# Patient Record
Sex: FEMALE | Race: WHITE | NOT HISPANIC OR LATINO
[De-identification: names, ages, dates, MRNs, and addresses within clinical notes are randomized per-mention and may not be internally consistent; named-entity substitution may affect disease eponyms.]

---

## 2018-06-24 ENCOUNTER — MOBILE ON CALL (OUTPATIENT)
Age: 78
End: 2018-06-24

## 2018-06-26 ENCOUNTER — APPOINTMENT (OUTPATIENT)
Dept: HEMATOLOGY ONCOLOGY | Facility: CLINIC | Age: 78
End: 2018-06-26

## 2021-04-21 ENCOUNTER — EMERGENCY (EMERGENCY)
Facility: HOSPITAL | Age: 81
LOS: 0 days | Discharge: HOME | End: 2021-04-21
Attending: EMERGENCY MEDICINE | Admitting: EMERGENCY MEDICINE
Payer: MEDICARE

## 2021-04-21 VITALS
WEIGHT: 214.95 LBS | RESPIRATION RATE: 20 BRPM | HEIGHT: 65 IN | DIASTOLIC BLOOD PRESSURE: 83 MMHG | SYSTOLIC BLOOD PRESSURE: 184 MMHG | HEART RATE: 95 BPM | OXYGEN SATURATION: 94 % | TEMPERATURE: 98 F

## 2021-04-21 DIAGNOSIS — I10 ESSENTIAL (PRIMARY) HYPERTENSION: ICD-10-CM

## 2021-04-21 DIAGNOSIS — R10.32 LEFT LOWER QUADRANT PAIN: ICD-10-CM

## 2021-04-21 DIAGNOSIS — Z87.19 PERSONAL HISTORY OF OTHER DISEASES OF THE DIGESTIVE SYSTEM: ICD-10-CM

## 2021-04-21 DIAGNOSIS — K21.9 GASTRO-ESOPHAGEAL REFLUX DISEASE WITHOUT ESOPHAGITIS: ICD-10-CM

## 2021-04-21 DIAGNOSIS — R10.31 RIGHT LOWER QUADRANT PAIN: ICD-10-CM

## 2021-04-21 DIAGNOSIS — R19.7 DIARRHEA, UNSPECIFIED: ICD-10-CM

## 2021-04-21 DIAGNOSIS — E78.5 HYPERLIPIDEMIA, UNSPECIFIED: ICD-10-CM

## 2021-04-21 LAB
ALBUMIN SERPL ELPH-MCNC: 4.6 G/DL — SIGNIFICANT CHANGE UP (ref 3.5–5.2)
ALP SERPL-CCNC: 103 U/L — SIGNIFICANT CHANGE UP (ref 30–115)
ALT FLD-CCNC: 17 U/L — SIGNIFICANT CHANGE UP (ref 0–41)
ANION GAP SERPL CALC-SCNC: 14 MMOL/L — SIGNIFICANT CHANGE UP (ref 7–14)
APPEARANCE UR: CLEAR — SIGNIFICANT CHANGE UP
AST SERPL-CCNC: 22 U/L — SIGNIFICANT CHANGE UP (ref 0–41)
BACTERIA # UR AUTO: NEGATIVE — SIGNIFICANT CHANGE UP
BASOPHILS # BLD AUTO: 0.03 K/UL — SIGNIFICANT CHANGE UP (ref 0–0.2)
BASOPHILS NFR BLD AUTO: 0.3 % — SIGNIFICANT CHANGE UP (ref 0–1)
BILIRUB SERPL-MCNC: 0.3 MG/DL — SIGNIFICANT CHANGE UP (ref 0.2–1.2)
BILIRUB UR-MCNC: NEGATIVE — SIGNIFICANT CHANGE UP
BUN SERPL-MCNC: 19 MG/DL — SIGNIFICANT CHANGE UP (ref 10–20)
CALCIUM SERPL-MCNC: 10 MG/DL — SIGNIFICANT CHANGE UP (ref 8.5–10.1)
CHLORIDE SERPL-SCNC: 106 MMOL/L — SIGNIFICANT CHANGE UP (ref 98–110)
CO2 SERPL-SCNC: 20 MMOL/L — SIGNIFICANT CHANGE UP (ref 17–32)
COLOR SPEC: SIGNIFICANT CHANGE UP
CREAT SERPL-MCNC: 1 MG/DL — SIGNIFICANT CHANGE UP (ref 0.7–1.5)
DIFF PNL FLD: NEGATIVE — SIGNIFICANT CHANGE UP
EOSINOPHIL # BLD AUTO: 0.01 K/UL — SIGNIFICANT CHANGE UP (ref 0–0.7)
EOSINOPHIL NFR BLD AUTO: 0.1 % — SIGNIFICANT CHANGE UP (ref 0–8)
EPI CELLS # UR: 0 /HPF — SIGNIFICANT CHANGE UP (ref 0–5)
GLUCOSE SERPL-MCNC: 148 MG/DL — HIGH (ref 70–99)
GLUCOSE UR QL: NEGATIVE — SIGNIFICANT CHANGE UP
HCT VFR BLD CALC: 40.3 % — SIGNIFICANT CHANGE UP (ref 37–47)
HGB BLD-MCNC: 12.9 G/DL — SIGNIFICANT CHANGE UP (ref 12–16)
HYALINE CASTS # UR AUTO: 1 /LPF — SIGNIFICANT CHANGE UP (ref 0–7)
IMM GRANULOCYTES NFR BLD AUTO: 0.4 % — HIGH (ref 0.1–0.3)
KETONES UR-MCNC: NEGATIVE — SIGNIFICANT CHANGE UP
LEUKOCYTE ESTERASE UR-ACNC: ABNORMAL
LIDOCAIN IGE QN: 13 U/L — SIGNIFICANT CHANGE UP (ref 7–60)
LYMPHOCYTES # BLD AUTO: 1.24 K/UL — SIGNIFICANT CHANGE UP (ref 1.2–3.4)
LYMPHOCYTES # BLD AUTO: 11.7 % — LOW (ref 20.5–51.1)
MCHC RBC-ENTMCNC: 26 PG — LOW (ref 27–31)
MCHC RBC-ENTMCNC: 32 G/DL — SIGNIFICANT CHANGE UP (ref 32–37)
MCV RBC AUTO: 81.3 FL — SIGNIFICANT CHANGE UP (ref 81–99)
MONOCYTES # BLD AUTO: 0.7 K/UL — HIGH (ref 0.1–0.6)
MONOCYTES NFR BLD AUTO: 6.6 % — SIGNIFICANT CHANGE UP (ref 1.7–9.3)
NEUTROPHILS # BLD AUTO: 8.62 K/UL — HIGH (ref 1.4–6.5)
NEUTROPHILS NFR BLD AUTO: 80.9 % — HIGH (ref 42.2–75.2)
NITRITE UR-MCNC: NEGATIVE — SIGNIFICANT CHANGE UP
NRBC # BLD: 0 /100 WBCS — SIGNIFICANT CHANGE UP (ref 0–0)
PH UR: 6 — SIGNIFICANT CHANGE UP (ref 5–8)
PLATELET # BLD AUTO: 264 K/UL — SIGNIFICANT CHANGE UP (ref 130–400)
POTASSIUM SERPL-MCNC: 4.2 MMOL/L — SIGNIFICANT CHANGE UP (ref 3.5–5)
POTASSIUM SERPL-SCNC: 4.2 MMOL/L — SIGNIFICANT CHANGE UP (ref 3.5–5)
PROT SERPL-MCNC: 7.7 G/DL — SIGNIFICANT CHANGE UP (ref 6–8)
PROT UR-MCNC: SIGNIFICANT CHANGE UP
RBC # BLD: 4.96 M/UL — SIGNIFICANT CHANGE UP (ref 4.2–5.4)
RBC # FLD: 19.9 % — HIGH (ref 11.5–14.5)
RBC CASTS # UR COMP ASSIST: 2 /HPF — SIGNIFICANT CHANGE UP (ref 0–4)
SODIUM SERPL-SCNC: 140 MMOL/L — SIGNIFICANT CHANGE UP (ref 135–146)
SP GR SPEC: 1.01 — SIGNIFICANT CHANGE UP (ref 1.01–1.03)
UROBILINOGEN FLD QL: SIGNIFICANT CHANGE UP
WBC # BLD: 10.64 K/UL — SIGNIFICANT CHANGE UP (ref 4.8–10.8)
WBC # FLD AUTO: 10.64 K/UL — SIGNIFICANT CHANGE UP (ref 4.8–10.8)
WBC UR QL: 1 /HPF — SIGNIFICANT CHANGE UP (ref 0–5)

## 2021-04-21 PROCEDURE — 99285 EMERGENCY DEPT VISIT HI MDM: CPT

## 2021-04-21 PROCEDURE — 74177 CT ABD & PELVIS W/CONTRAST: CPT | Mod: 26,MA

## 2021-04-21 RX ORDER — MORPHINE SULFATE 50 MG/1
4 CAPSULE, EXTENDED RELEASE ORAL ONCE
Refills: 0 | Status: DISCONTINUED | OUTPATIENT
Start: 2021-04-21 | End: 2021-04-21

## 2021-04-21 RX ORDER — ONDANSETRON 8 MG/1
4 TABLET, FILM COATED ORAL ONCE
Refills: 0 | Status: COMPLETED | OUTPATIENT
Start: 2021-04-21 | End: 2021-04-21

## 2021-04-21 RX ORDER — SODIUM CHLORIDE 9 MG/ML
1000 INJECTION, SOLUTION INTRAVENOUS ONCE
Refills: 0 | Status: COMPLETED | OUTPATIENT
Start: 2021-04-21 | End: 2021-04-21

## 2021-04-21 RX ADMIN — SODIUM CHLORIDE 1000 MILLILITER(S): 9 INJECTION, SOLUTION INTRAVENOUS at 01:52

## 2021-04-21 RX ADMIN — MORPHINE SULFATE 4 MILLIGRAM(S): 50 CAPSULE, EXTENDED RELEASE ORAL at 01:52

## 2021-04-21 RX ADMIN — ONDANSETRON 4 MILLIGRAM(S): 8 TABLET, FILM COATED ORAL at 01:52

## 2021-04-21 RX ADMIN — MORPHINE SULFATE 4 MILLIGRAM(S): 50 CAPSULE, EXTENDED RELEASE ORAL at 03:36

## 2021-04-21 NOTE — ED PROVIDER NOTE - NS ED ROS FT
CONSTITUTIONAL: (-) fevers, (-) chills, (-) malaise, (-) decreased appetite  ENT: (-) congestion, (-) rhinorrhea, (-) sore throat  NECK: (-) neck pain, (-) neck stiffness  CARDIO: (-) chest pain, (-) palpitations, (-) edema  PULM: (-) cough, (-) sputum, (-) chest tightness, (-) shortness of breath  GI: see HPI  : (-) dysuria, (-) hematuria, (-) frequency, (-) urgency, (-) flank pain  HEME: (-) easy bruising, (-) easy bleeding  MSK: (-) back pain, (-) myalgias  SKIN: (-) rashes, (-) pallor, (-) jaundice  NEURO: (-) headache, (-) dizziness, (-) lightheadedness, (-) weakness, (-) syncope    *all other systems negative except as documented above and in the HPI*

## 2021-04-21 NOTE — ED PROVIDER NOTE - CLINICAL SUMMARY MEDICAL DECISION MAKING FREE TEXT BOX
81 yo F, hx of HTN, HLD, GERD, diverticulosis here for assessment of lower abdominal pain -- pain worse on L side, radiates from lower abdomen to back. No nausea, vomiting, fever, chills -- had loose stools x 1 week prior to onset, non bloody.     VS normal, on exam is well appearing in no distress, clear lungs, RRR, soft, NT, ND abdomen, no CVA ttp.     UA negative for infection, labs unremarkable, CT without acute abdominal findings.     Patient is resting comfortably, in no distress, discussed need for close monitoring with GI, return precautions.

## 2021-04-21 NOTE — ED PROVIDER NOTE - PATIENT PORTAL LINK FT
You can access the FollowMyHealth Patient Portal offered by [facility] by registering at the following website: [email or web address]. By joining [facility]’s FollowMyHealth portal, you will also be able to view your health information using other applications (apps) compatible with our system.

## 2021-04-21 NOTE — ED ADULT NURSE NOTE - PMH
GERD (Gastroesophageal Reflux Disease)    HTN - Hypertension    Hypercholesteremia    OA (Osteoarthritis)

## 2021-04-21 NOTE — ED PROVIDER NOTE - PROGRESS NOTE DETAILS
RENAN: patient sleeping, wakes easily to voice. states pain has improved. abd soft, nontender throughout. ct read pending, will continue to monitor. RENAN: Results discussed with patient, printed copies given. Recommended GI f/u. Patient agreeable and verbalizes understanding of plan of care, f/u, and return precautions.

## 2021-04-21 NOTE — ED PROVIDER NOTE - OBJECTIVE STATEMENT
80 year old female w hx of HTN, HLD, GERD, diverticulosis (GI Dr. Fam) presents to the ED for evaluation of gradual onset constant b/l lower abdominal pain, L>R with radiation into her back. Pain was preceded by 1 week of intermittent episodes of nonbloody diarrhea. Denies fevers/chills, chest pain, sob, n/v, constipation, obstipation, irritative voiding symptoms, black/bloody stools, recent travel/sick contacts/antibiotics, abnl vaginal bleeding/discharge.

## 2021-04-21 NOTE — ED PROVIDER NOTE - PHYSICAL EXAMINATION
VITALS:  I have reviewed the initial vital signs.  GENERAL: Well-developed, well-nourished, in no acute distress. Nontoxic.  HEENT: Sclera clear. No conjunctival pallor. EOMI, PERRLA. MMM.  NECK: supple w FROM.   CARDIO: RRR, nl S1 and S2. No murmurs, rubs, or gallops.   PULM: Normal effort. CTA b/l without wheezes, rales, or rhonchi.  MSK: Normal, steady gait.  GI: Normal bowel sounds. Abdomen soft and non-distended. +b/l lower abdominal pain, L>R, without rebound or guarding.   : No CVA tenderness b/l.  SKIN: Warm, dry. No pallor. No rash. No jaundice.   NEURO: A&Ox3. Speech clear.  No focal deficits.  PSYCH: Calm and cooperative.

## 2021-04-21 NOTE — ED PROVIDER NOTE - MDM PATIENT STATEMENT FOR ADDL TREATMENT
Discharge Information    IV Discontiued Time:  NA    Amount of Fluid Infused:  NA    Discharge Criteria = When patient returns to baseline or as per MD order    Consciousness:  Pt is fully awake    Circulation:  BP +/- 20% of pre-procedure level    Respiration:  Patient is able to breathe deeply    O2 Sat:  Patient is able to maintain O2 Sat >92% on room air    Activity:  Moves 4 extremities on command    Ambulation:  Patient is able to stand and walk or stand and pivot into wheelchair    Dressing:  Clean/dry or No Dressing    Notes:   Discharge instructions and AVS given to patient    Patient meets criteria for discharge?  YES    Admitted to PCU?  No    Responsible adult present to accompany patient home?  Yes    Signature/Title:    Devora Jain RN Care Coordinator  [facility]  
Pre-procedure Intake    Have you been fasting? No     If yes, for how long?     Are you taking a prescribed blood thinner such as coumadin, Plavix, Xarelto?    Yes -   Plavix    If yes, when did you take your last dose? A WEEK AGO    Do you take aspirin?  No    If cervical procedure, have you held aspirin for 6 days?   NA    Do you have any allergies to contrast dye, iodine, steroid and/or numbing medications?  NO    Are you currently taking antibiotics or have an active infection?  NO    Have you had a fever/elevated temperature within the past week? NO    Are you currently taking oral steroids? NO    Do you have a ? Yes       Are you pregnant or breastfeeding?  NO    Are the vital signs normal?  Yes        
Patient with one or more new problems requiring additional work-up/treatment.

## 2021-04-21 NOTE — ED ADULT NURSE NOTE - OBJECTIVE STATEMENT
80yr old female presents w. intermittent abd pain for 1 wk. pt reports nausua and diarrhea. denies vomiting. pt has a history of diverticulitis

## 2021-04-21 NOTE — ED PROVIDER NOTE - CARE PROVIDER_API CALL
July Fam  GASTROENTEROLOGY  [address] [place] [address]  Phone: [phone]  Fax: [phone]  Follow Up Time: 1-3 Days

## 2021-04-23 LAB
CULTURE RESULTS: SIGNIFICANT CHANGE UP
SPECIMEN SOURCE: SIGNIFICANT CHANGE UP

## 2025-01-19 ENCOUNTER — INPATIENT (INPATIENT)
Facility: HOSPITAL | Age: 85
LOS: 2 days | Discharge: HOME | DRG: 194 | End: 2025-01-22
Attending: HOSPITALIST | Admitting: INTERNAL MEDICINE
Payer: MEDICARE

## 2025-01-19 VITALS
RESPIRATION RATE: 20 BRPM | WEIGHT: 210.1 LBS | HEART RATE: 94 BPM | OXYGEN SATURATION: 97 % | HEIGHT: 64 IN | DIASTOLIC BLOOD PRESSURE: 93 MMHG | TEMPERATURE: 98 F | SYSTOLIC BLOOD PRESSURE: 170 MMHG

## 2025-01-19 DIAGNOSIS — J10.1 INFLUENZA DUE TO OTHER IDENTIFIED INFLUENZA VIRUS WITH OTHER RESPIRATORY MANIFESTATIONS: ICD-10-CM

## 2025-01-19 LAB
ALBUMIN SERPL ELPH-MCNC: 3.7 G/DL — SIGNIFICANT CHANGE UP (ref 3.5–5.2)
ALP SERPL-CCNC: 93 U/L — SIGNIFICANT CHANGE UP (ref 30–115)
ALT FLD-CCNC: 17 U/L — SIGNIFICANT CHANGE UP (ref 0–41)
ANION GAP SERPL CALC-SCNC: 13 MMOL/L — SIGNIFICANT CHANGE UP (ref 7–14)
AST SERPL-CCNC: 33 U/L — SIGNIFICANT CHANGE UP (ref 0–41)
BASOPHILS # BLD AUTO: 0.03 K/UL — SIGNIFICANT CHANGE UP (ref 0–0.2)
BASOPHILS NFR BLD AUTO: 0.7 % — SIGNIFICANT CHANGE UP (ref 0–1)
BILIRUB SERPL-MCNC: 0.2 MG/DL — SIGNIFICANT CHANGE UP (ref 0.2–1.2)
BUN SERPL-MCNC: 39 MG/DL — HIGH (ref 10–20)
CALCIUM SERPL-MCNC: 8.6 MG/DL — SIGNIFICANT CHANGE UP (ref 8.4–10.5)
CHLORIDE SERPL-SCNC: 102 MMOL/L — SIGNIFICANT CHANGE UP (ref 98–110)
CO2 SERPL-SCNC: 23 MMOL/L — SIGNIFICANT CHANGE UP (ref 17–32)
CREAT SERPL-MCNC: 1.3 MG/DL — SIGNIFICANT CHANGE UP (ref 0.7–1.5)
EGFR: 40 ML/MIN/1.73M2 — LOW
EOSINOPHIL # BLD AUTO: 0.1 K/UL — SIGNIFICANT CHANGE UP (ref 0–0.7)
EOSINOPHIL NFR BLD AUTO: 2.4 % — SIGNIFICANT CHANGE UP (ref 0–8)
FLUAV AG NPH QL: DETECTED
FLUBV AG NPH QL: SIGNIFICANT CHANGE UP
GLUCOSE SERPL-MCNC: 115 MG/DL — HIGH (ref 70–99)
HCT VFR BLD CALC: 45.5 % — SIGNIFICANT CHANGE UP (ref 37–47)
HGB BLD-MCNC: 14.8 G/DL — SIGNIFICANT CHANGE UP (ref 12–16)
IMM GRANULOCYTES NFR BLD AUTO: 0.2 % — SIGNIFICANT CHANGE UP (ref 0.1–0.3)
LYMPHOCYTES # BLD AUTO: 1.16 K/UL — LOW (ref 1.2–3.4)
LYMPHOCYTES # BLD AUTO: 27.8 % — SIGNIFICANT CHANGE UP (ref 20.5–51.1)
MCHC RBC-ENTMCNC: 31.2 PG — HIGH (ref 27–31)
MCHC RBC-ENTMCNC: 32.5 G/DL — SIGNIFICANT CHANGE UP (ref 32–37)
MCV RBC AUTO: 96 FL — SIGNIFICANT CHANGE UP (ref 81–99)
MONOCYTES # BLD AUTO: 0.78 K/UL — HIGH (ref 0.1–0.6)
MONOCYTES NFR BLD AUTO: 18.7 % — HIGH (ref 1.7–9.3)
NEUTROPHILS # BLD AUTO: 2.09 K/UL — SIGNIFICANT CHANGE UP (ref 1.4–6.5)
NEUTROPHILS NFR BLD AUTO: 50.2 % — SIGNIFICANT CHANGE UP (ref 42.2–75.2)
NRBC # BLD: 0 /100 WBCS — SIGNIFICANT CHANGE UP (ref 0–0)
NRBC BLD-RTO: 0 /100 WBCS — SIGNIFICANT CHANGE UP (ref 0–0)
PLATELET # BLD AUTO: 130 K/UL — SIGNIFICANT CHANGE UP (ref 130–400)
PMV BLD: 10.4 FL — SIGNIFICANT CHANGE UP (ref 7.4–10.4)
POTASSIUM SERPL-MCNC: 4.5 MMOL/L — SIGNIFICANT CHANGE UP (ref 3.5–5)
POTASSIUM SERPL-SCNC: 4.5 MMOL/L — SIGNIFICANT CHANGE UP (ref 3.5–5)
PROT SERPL-MCNC: 6.3 G/DL — SIGNIFICANT CHANGE UP (ref 6–8)
RBC # BLD: 4.74 M/UL — SIGNIFICANT CHANGE UP (ref 4.2–5.4)
RBC # FLD: 12.9 % — SIGNIFICANT CHANGE UP (ref 11.5–14.5)
RSV RNA NPH QL NAA+NON-PROBE: SIGNIFICANT CHANGE UP
SARS-COV-2 RNA SPEC QL NAA+PROBE: SIGNIFICANT CHANGE UP
SODIUM SERPL-SCNC: 138 MMOL/L — SIGNIFICANT CHANGE UP (ref 135–146)
WBC # BLD: 4.17 K/UL — LOW (ref 4.8–10.8)
WBC # FLD AUTO: 4.17 K/UL — LOW (ref 4.8–10.8)

## 2025-01-19 PROCEDURE — 80048 BASIC METABOLIC PNL TOTAL CA: CPT

## 2025-01-19 PROCEDURE — 36415 COLL VENOUS BLD VENIPUNCTURE: CPT

## 2025-01-19 PROCEDURE — 94640 AIRWAY INHALATION TREATMENT: CPT

## 2025-01-19 PROCEDURE — 93005 ELECTROCARDIOGRAM TRACING: CPT

## 2025-01-19 PROCEDURE — 85027 COMPLETE CBC AUTOMATED: CPT

## 2025-01-19 PROCEDURE — 93010 ELECTROCARDIOGRAM REPORT: CPT

## 2025-01-19 PROCEDURE — 99223 1ST HOSP IP/OBS HIGH 75: CPT | Mod: FS

## 2025-01-19 PROCEDURE — 99285 EMERGENCY DEPT VISIT HI MDM: CPT | Mod: FS

## 2025-01-19 PROCEDURE — 71045 X-RAY EXAM CHEST 1 VIEW: CPT | Mod: 26

## 2025-01-19 RX ORDER — LATANOPROST 50 UG/ML
1 SOLUTION OPHTHALMIC AT BEDTIME
Refills: 0 | Status: DISCONTINUED | OUTPATIENT
Start: 2025-01-19 | End: 2025-01-22

## 2025-01-19 RX ORDER — APIXABAN 5 MG/1
1 TABLET, FILM COATED ORAL
Refills: 0 | DISCHARGE

## 2025-01-19 RX ORDER — APIXABAN 5 MG/1
5 TABLET, FILM COATED ORAL EVERY 12 HOURS
Refills: 0 | Status: DISCONTINUED | OUTPATIENT
Start: 2025-01-19 | End: 2025-01-22

## 2025-01-19 RX ORDER — ALPRAZOLAM 2 MG
0.25 TABLET ORAL
Refills: 0 | Status: DISCONTINUED | OUTPATIENT
Start: 2025-01-19 | End: 2025-01-22

## 2025-01-19 RX ORDER — METHYLPREDNISOLONE ACETATE 40 MG/ML
40 VIAL (ML) INJECTION
Refills: 0 | Status: DISCONTINUED | OUTPATIENT
Start: 2025-01-19 | End: 2025-01-21

## 2025-01-19 RX ORDER — ATORVASTATIN CALCIUM 80 MG/1
10 TABLET, FILM COATED ORAL AT BEDTIME
Refills: 0 | Status: DISCONTINUED | OUTPATIENT
Start: 2025-01-19 | End: 2025-01-22

## 2025-01-19 RX ORDER — PRAVASTATIN SODIUM 40 MG
1 TABLET ORAL
Refills: 0 | DISCHARGE

## 2025-01-19 RX ORDER — CYANOCOBALAMIN (VITAMIN B-12) 1000MCG/ML
1 VIAL (ML) INJECTION
Refills: 0 | DISCHARGE

## 2025-01-19 RX ORDER — ONDANSETRON 4 MG/1
4 TABLET, ORALLY DISINTEGRATING ORAL EVERY 4 HOURS
Refills: 0 | Status: DISCONTINUED | OUTPATIENT
Start: 2025-01-19 | End: 2025-01-22

## 2025-01-19 RX ORDER — OSELTAMIVIR PHOSPHATE 75 MG/1
75 CAPSULE ORAL
Refills: 0 | Status: DISCONTINUED | OUTPATIENT
Start: 2025-01-19 | End: 2025-01-20

## 2025-01-19 RX ORDER — CHLORHEXIDINE GLUCONATE 4 %
1 LIQUID (ML) TOPICAL DAILY
Refills: 0 | Status: DISCONTINUED | OUTPATIENT
Start: 2025-01-19 | End: 2025-01-22

## 2025-01-19 RX ORDER — MAGNESIUM, ALUMINUM HYDROXIDE 200-225/5
30 SUSPENSION, ORAL (FINAL DOSE FORM) ORAL EVERY 4 HOURS
Refills: 0 | Status: DISCONTINUED | OUTPATIENT
Start: 2025-01-19 | End: 2025-01-22

## 2025-01-19 RX ORDER — OMEGA-3 FATTY ACIDS CAP 1000 MG 1000 MG
2 CAP ORAL
Refills: 0 | DISCHARGE

## 2025-01-19 RX ORDER — PANTOPRAZOLE 20 MG/1
40 TABLET, DELAYED RELEASE ORAL
Refills: 0 | Status: DISCONTINUED | OUTPATIENT
Start: 2025-01-19 | End: 2025-01-22

## 2025-01-19 RX ORDER — LOSARTAN POTASSIUM 100 MG
1 TABLET ORAL
Refills: 0 | DISCHARGE

## 2025-01-19 RX ORDER — LOSARTAN POTASSIUM 100 MG
100 TABLET ORAL DAILY
Refills: 0 | Status: DISCONTINUED | OUTPATIENT
Start: 2025-01-19 | End: 2025-01-19

## 2025-01-19 RX ORDER — AZITHROMYCIN DIHYDRATE 500 MG/1
TABLET, FILM COATED ORAL
Refills: 0 | Status: DISCONTINUED | OUTPATIENT
Start: 2025-01-19 | End: 2025-01-20

## 2025-01-19 RX ORDER — AZITHROMYCIN DIHYDRATE 500 MG/1
500 TABLET, FILM COATED ORAL EVERY 24 HOURS
Refills: 0 | Status: DISCONTINUED | OUTPATIENT
Start: 2025-01-20 | End: 2025-01-20

## 2025-01-19 RX ORDER — IPRATROPIUM BROMIDE AND ALBUTEROL SULFATE .5; 2.5 MG/3ML; MG/3ML
3 SOLUTION RESPIRATORY (INHALATION) ONCE
Refills: 0 | Status: COMPLETED | OUTPATIENT
Start: 2025-01-19 | End: 2025-01-19

## 2025-01-19 RX ORDER — LATANOPROST 50 UG/ML
0 SOLUTION OPHTHALMIC
Refills: 0 | DISCHARGE

## 2025-01-19 RX ORDER — METHYLPREDNISOLONE ACETATE 40 MG/ML
125 VIAL (ML) INJECTION ONCE
Refills: 0 | Status: COMPLETED | OUTPATIENT
Start: 2025-01-19 | End: 2025-01-19

## 2025-01-19 RX ORDER — IPRATROPIUM BROMIDE AND ALBUTEROL SULFATE .5; 2.5 MG/3ML; MG/3ML
3 SOLUTION RESPIRATORY (INHALATION) EVERY 6 HOURS
Refills: 0 | Status: DISCONTINUED | OUTPATIENT
Start: 2025-01-19 | End: 2025-01-22

## 2025-01-19 RX ORDER — METHOCARBAMOL 500 MG
2 TABLET ORAL
Refills: 0 | DISCHARGE

## 2025-01-19 RX ORDER — METOPROLOL SUCCINATE 25 MG
50 TABLET, EXTENDED RELEASE 24 HR ORAL DAILY
Refills: 0 | Status: DISCONTINUED | OUTPATIENT
Start: 2025-01-19 | End: 2025-01-22

## 2025-01-19 RX ORDER — FERROUS GLUCONATE 300(35)MG
0 TABLET ORAL
Refills: 0 | DISCHARGE

## 2025-01-19 RX ORDER — ACETAMINOPHEN 160 MG/5ML
2 SUSPENSION ORAL
Refills: 0 | DISCHARGE

## 2025-01-19 RX ORDER — LOSARTAN POTASSIUM 100 MG
100 TABLET ORAL DAILY
Refills: 0 | Status: DISCONTINUED | OUTPATIENT
Start: 2025-01-19 | End: 2025-01-22

## 2025-01-19 RX ORDER — OMEGA-3 FATTY ACIDS CAP 1000 MG 1000 MG
4 CAP ORAL DAILY
Refills: 0 | Status: DISCONTINUED | OUTPATIENT
Start: 2025-01-19 | End: 2025-01-22

## 2025-01-19 RX ORDER — ACETAMINOPHEN, DIPHENHYDRAMINE HCL, PHENYLEPHRINE HCL 325; 25; 5 MG/1; MG/1; MG/1
3 TABLET ORAL AT BEDTIME
Refills: 0 | Status: DISCONTINUED | OUTPATIENT
Start: 2025-01-19 | End: 2025-01-22

## 2025-01-19 RX ORDER — SENNOSIDES 8.6 MG
2 TABLET ORAL AT BEDTIME
Refills: 0 | Status: DISCONTINUED | OUTPATIENT
Start: 2025-01-19 | End: 2025-01-22

## 2025-01-19 RX ORDER — AZITHROMYCIN DIHYDRATE 500 MG/1
500 TABLET, FILM COATED ORAL ONCE
Refills: 0 | Status: COMPLETED | OUTPATIENT
Start: 2025-01-19 | End: 2025-01-19

## 2025-01-19 RX ORDER — FERROUS SULFATE 325(65) MG
325 TABLET ORAL DAILY
Refills: 0 | Status: DISCONTINUED | OUTPATIENT
Start: 2025-01-19 | End: 2025-01-22

## 2025-01-19 RX ORDER — ACETAMINOPHEN 160 MG/5ML
650 SUSPENSION ORAL EVERY 4 HOURS
Refills: 0 | Status: DISCONTINUED | OUTPATIENT
Start: 2025-01-19 | End: 2025-01-22

## 2025-01-19 RX ORDER — METHOCARBAMOL 500 MG
500 TABLET ORAL
Refills: 0 | Status: DISCONTINUED | OUTPATIENT
Start: 2025-01-19 | End: 2025-01-22

## 2025-01-19 RX ADMIN — Medication 125 MILLIGRAM(S): at 18:34

## 2025-01-19 RX ADMIN — IPRATROPIUM BROMIDE AND ALBUTEROL SULFATE 3 MILLILITER(S): .5; 2.5 SOLUTION RESPIRATORY (INHALATION) at 18:34

## 2025-01-19 RX ADMIN — LATANOPROST 1 DROP(S): 50 SOLUTION OPHTHALMIC at 21:33

## 2025-01-19 RX ADMIN — Medication 100 MILLIGRAM(S): at 21:33

## 2025-01-19 RX ADMIN — AZITHROMYCIN DIHYDRATE 500 MILLIGRAM(S): 500 TABLET, FILM COATED ORAL at 21:07

## 2025-01-19 RX ADMIN — ATORVASTATIN CALCIUM 10 MILLIGRAM(S): 80 TABLET, FILM COATED ORAL at 21:33

## 2025-01-19 RX ADMIN — Medication 0.25 MILLIGRAM(S): at 21:32

## 2025-01-19 RX ADMIN — APIXABAN 5 MILLIGRAM(S): 5 TABLET, FILM COATED ORAL at 21:02

## 2025-01-19 NOTE — ED PROVIDER NOTE - ATTENDING APP SHARED VISIT CONTRIBUTION OF CARE
84-year-old female, past medical history of A-fib on Eliquis, hypertension, osteoporosis, presenting for cough for 1 week, no relieving or aggravating factors, associated with subjective chills.  Of note her son was sick with similar symptoms couple weeks ago.  She had amoxicillin at home so has been taking that for the last week.  Earlier today she had a coughing fit and she had difficulty catching her breath but now feels improved.    CONSTITUTIONAL: Well-developed; well-nourished; in no acute distress.   SKIN: warm, dry  HEAD: Normocephalic  EYES:  no conjunctival erythema  ENT: No nasal discharge; airway clear.  NECK: Supple  CARD: S1, S2 normal; Regular rate and rhythm.   RESP: inspiratory wheezing. no increased respiratory effort. speaking full sentences  ABD: soft ntnd  EXT: Normal ROM.  No clubbing, cyanosis or edema.   NEURO: Alert, oriented, grossly unremarkable  PSYCH: Cooperative, appropriate.

## 2025-01-19 NOTE — H&P ADULT - ASSESSMENT
Flu 84-year-old female accompanied by her son and daughter at bed side.Pt  presenting for cough for 1 week, no relieving or aggravating factors, associated with subjective chills., past medical history of A-fib on Eliquis, hypertension, osteoporosis,   OPt states  her son was sick with similar symptoms couple weeks ago.  She had amoxicillin at home so has been taking that for the last week.  Earlier today she had a coughing fit and she had difficulty catching her breath but now feels improved      Flu  Tamilfu  IV ABX  c/w methylprednisolone . It was started in [facility]  IV Zithromax  Id consult  F/U Am labs, cbc, BMP      continue home med as per pMD    A fib on eliquis    HTN:  C/W metoprolol    Dyslipidemia:  C/W Statin    Anxiet:  xanax 0.25 mg po Q 12 prn    anemia:  feso4  325mg po Qd    prophylaxsi GI/VTE    Case D/W Dr becerra 84-year-old female accompanied by her son and daughter at bed side.Pt  presenting for cough for 1 week, no relieving or aggravating factors, associated with subjective chills., past medical history of A-fib on Eliquis, hypertension, osteoporosis,   OPt states  her son was sick with similar symptoms couple weeks ago.  She had amoxicillin at home so has been taking that for the last week.  Earlier today she had a coughing fit and she had difficulty catching her breath but now feels improved      #Flu with wheezing   Tamiflu  IV ABX  c/w methylprednisolone . It was started in [facility]  Id consult  F/U Am labs, cbc, BMP  oxygen as needed      #A fib on eliquis  continue with metoprolol for rate control    #HTN:  C/W metoprolol    #Dyslipidemia:  C/W Statin    #Anxiey   xanax 0.25 mg po Q 12 prn    #anemia:  feso4  325mg po Qd    prophylaxsi GI/VTE    Plan of care was discussed with patient, and family in great details, All questions were answered to their satisfication.  Seems to understand, and in agreement

## 2025-01-19 NOTE — ED PROVIDER NOTE - TEMPLATE
Neuro consult completed. Dictated note to follow. Echo is pending. May be able to be d/c after echo pending results. General

## 2025-01-19 NOTE — ED PROVIDER NOTE - PHYSICAL EXAMINATION
--EXAM--  VITAL SIGNS: I have reviewed vs documented at present.  CONSTITUTIONAL: Well-developed; well-nourished; in no acute distress.   SKIN: Warm and dry, no acute rash.   HEAD: Normocephalic; atraumatic.  EYES: PERRL, EOM intact; conjunctiva and sclera clear. No nystagmus.  ENT: No nasal discharge; airway clear.  NECK: Supple; non tender.  CARD: S1, S2, Regular rate and rhythm.   RESP: positive inspiratory wheezing   ABD: Normal bowel sounds; soft; non-distended; non-tender.  EXT: Normal ROM.   NEURO: Alert, oriented, grossly unremarkable. Strength 5/5 in all extremities. Sensation intact throughout.  PSYCH: Cooperative, appropriate.

## 2025-01-19 NOTE — H&P ADULT - NSHPPHYSICALEXAM_GEN_ALL_CORE
Vital Signs Last 24 Hrs  T(C): 36.6 (19 Jan 2025 20:12), Max: 36.6 (19 Jan 2025 17:53)  T(F): 97.8 (19 Jan 2025 20:12), Max: 97.9 (19 Jan 2025 17:53)  HR: 81 (19 Jan 2025 20:12) (81 - 94)  BP: 137/91 (19 Jan 2025 20:12) (137/91 - 170/93)  BP(mean): 106 (19 Jan 2025 20:12) (106 - 106)  RR: 20 (19 Jan 2025 20:12) (20 - 20)  SpO2: 95% (19 Jan 2025 20:12) (95% - 97%)    Parameters below as of 19 Jan 2025 20:12  Patient On (Oxygen Delivery Method): room air      GENERAL:  85y/o Female , cough, NAD, resting comfortably.  HEAD:  Atraumatic, Normocephalic  EYES: EOMI, PERRLA, conjunctiva and sclera clear  NECK: Supple, No JVD, no cervical lymphadenopathy, non-tender  CHEST/LUNG: Clear to auscultation bilaterally; No wheeze, rhonchi, or rales  HEART: Regular rate and rhythm; S1&S2  ABDOMEN: Soft, Nontender, Nondistended x 4 quadrants; Bowel sounds present  EXTREMITIES:   Peripheral Pulses Present, No clubbing, no cyanosis, or no edema, no calf tenderness  PSYCH: AAOx3, cooperative, appropriate  NEUROLOGY: WNL  SKIN: WNL Vital Signs Last 24 Hrs  T(C): 36.6 (19 Jan 2025 20:12), Max: 36.6 (19 Jan 2025 17:53)  T(F): 97.8 (19 Jan 2025 20:12), Max: 97.9 (19 Jan 2025 17:53)  HR: 81 (19 Jan 2025 20:12) (81 - 94)  BP: 137/91 (19 Jan 2025 20:12) (137/91 - 170/93)  BP(mean): 106 (19 Jan 2025 20:12) (106 - 106)  RR: 20 (19 Jan 2025 20:12) (20 - 20)  SpO2: 95% (19 Jan 2025 20:12) (95% - 97%)    Parameters below as of 19 Jan 2025 20:12  Patient On (Oxygen Delivery Method): room air      GENERAL:  83y/o Female , cough, NAD, resting comfortably.  HEAD:  Atraumatic, Normocephalic  EYES: EOMI, PERRLA, conjunctiva and sclera clear  NECK: Supple, No JVD, no cervical lymphadenopathy, non-tender  CHEST/LUNG: + wheezing with good air entry, no retractions or accessory muscle   HEART: Regular rate and rhythm; S1&S2  ABDOMEN: Soft, Nontender, Nondistended x 4 quadrants; Bowel sounds present  EXTREMITIES:   Peripheral Pulses Present, No clubbing, no cyanosis, or no edema, no calf tenderness  PSYCH: AAOx3, cooperative, appropriate  NEUROLOGY: WNL  SKIN: WNL

## 2025-01-19 NOTE — H&P ADULT - HISTORY OF PRESENT ILLNESS
84-year-old female presenting for cough for 1 week, no relieving or aggravating factors, associated with subjective chills., past medical history of A-fib on Eliquis, hypertension, osteoporosis,   Of note her son was sick with similar symptoms couple weeks ago.  She had amoxicillin at home so has been taking that for the last week.  Earlier today she had a coughing fit and she had difficulty catching her breath but now feels improved. 84-year-old female accompanied by her son and daughter at bed side.Pt  presenting for cough for 1 week, no relieving or aggravating factors, associated with subjective chills., past medical history of A-fib on Eliquis, hypertension, osteoporosis,   Of note her son was sick with similar symptoms couple weeks ago.  She had amoxicillin at home so has been taking that for the last week.  Earlier today she had a coughing fit and she had difficulty catching her breath but now feels improved.    med rec done at bed side. 84-year-old female accompanied by her son and daughter at bed side.Pt  presenting for cough for 1 week, no relieving or aggravating factors, associated with subjective chills., past medical history of A-fib on Eliquis, hypertension, osteoporosis,   Of note her son was sick with similar symptoms couple weeks ago.  She had amoxicillin at home so has been taking that for the last week.  Earlier today she had a coughing fit and she had difficulty catching her breath but now feels improved.    med rec done at bed side.    denies any CP, SOB, palpitation, abdm pain, N/V/D, numbness, weakness

## 2025-01-19 NOTE — ED PROVIDER NOTE - NSICDXPASTSURGICALHX_GEN_ALL_CORE_FT
PAST SURGICAL HISTORY:  Cataract 2 years ago    History of Rotator Cuff Surgery right shoulder 10 years ago

## 2025-01-19 NOTE — ED PROVIDER NOTE - CLINICAL SUMMARY MEDICAL DECISION MAKING FREE TEXT BOX
84-year-old female, past medical history of A-fib on Eliquis, hypertension, osteoporosis, presenting for cough for 1 week, no relieving or aggravating factors, associated with subjective chills.  Of note her son was sick with similar symptoms couple weeks ago.  She had amoxicillin at home so has been taking that for the last week.  Earlier today she had a coughing fit and she had difficulty catching her breath but now feels improved. 84-year-old female, past medical history of A-fib on Eliquis, hypertension, osteoporosis, presenting for cough for 1 week, no relieving or aggravating factors, associated with subjective chills.  Of note her son was sick with similar symptoms couple weeks ago.  She had amoxicillin at home so has been taking that for the last week.  Earlier today she had a coughing fit and she had difficulty catching her breath but now feels improved.  Labs were ordered and reviewed.  Imaging was ordered and reviewed by me.  Appropriate medications for patient's presenting complaints were ordered and effects were reassessed.  Additional history was obtained from son at bedside.  Escalation to admission/observation was considered.  Patient influenza A. No focal opacities.  Discussed results with patient.  Given return precautions and follow up outpatient.  Patient continues to be well appearing.  She feels improved and is comfortable with plan. 84-year-old female, past medical history of A-fib on Eliquis, hypertension, osteoporosis, presenting for cough for 1 week, no relieving or aggravating factors, associated with subjective chills.  Of note her son was sick with similar symptoms couple weeks ago.  She had amoxicillin at home so has been taking that for the last week.  Earlier today she had a coughing fit and she had difficulty catching her breath but now feels improved.  Labs were ordered and reviewed.  Imaging was ordered and reviewed by me.  Appropriate medications for patient's presenting complaints were ordered and effects were reassessed.  Additional history was obtained from son at bedside.  Escalation to admission/observation was considered.  Patient influenza A. No focal opacities.  Discussed results with patient.  She states she is feeling very weak and unable to care for self at home.  Patient requires admission.

## 2025-01-19 NOTE — ED PROVIDER NOTE - OBJECTIVE STATEMENT
this is a 83 yo female presents to ed for evaluation of cough and weakness. patient started herself on amoxicillin for cough.  patient states she is not improving and is getting weaker .

## 2025-01-19 NOTE — ED PROVIDER NOTE - NSICDXPASTMEDICALHX_GEN_ALL_CORE_FT
PAST MEDICAL HISTORY:  GERD (Gastroesophageal Reflux Disease)     HTN - Hypertension     Hypercholesteremia     OA (Osteoarthritis)

## 2025-01-19 NOTE — H&P ADULT - NSHPLABSRESULTS_GEN_ALL_CORE
14.8   4.17  )-----------( 130      ( 19 Jan 2025 18:22 )             45.5       01-19    138  |  102  |  39[H]  ----------------------------<  115[H]  4.5   |  23  |  1.3    Ca    8.6      19 Jan 2025 18:22    TPro  6.3  /  Alb  3.7  /  TBili  0.2  /  DBili  x   /  AST  33  /  ALT  17  /  AlkPhos  93  01-19              Urinalysis Basic - ( 19 Jan 2025 18:22 )    Color: x / Appearance: x / SG: x / pH: x  Gluc: 115 mg/dL / Ketone: x  / Bili: x / Urobili: x   Blood: x / Protein: x / Nitrite: x   Leuk Esterase: x / RBC: x / WBC x   Sq Epi: x / Non Sq Epi: x / Bacteria: x      Lactate Trend      CAPILLARY BLOOD GLUCOSE

## 2025-01-19 NOTE — H&P ADULT - CONVERSATION DETAILS
1/27 CT C/A/P /w IV contrast negative for hemorrhage  1/31 coumadin resumed   Continue Plavix given recent stents Current plan of care, and prognosis were discussed with patient in details, all option were discussed in details  including CPR procedure, shock procedure, and intubation procedure.   Explained that duration of machines/inbutaion/pressor are unknown, and they are based on the underline issue.   patient opted for full code with full understanding of what it involves in details  time 30min

## 2025-01-20 LAB
ANION GAP SERPL CALC-SCNC: 15 MMOL/L — HIGH (ref 7–14)
BUN SERPL-MCNC: 33 MG/DL — HIGH (ref 10–20)
CALCIUM SERPL-MCNC: 9 MG/DL — SIGNIFICANT CHANGE UP (ref 8.4–10.5)
CHLORIDE SERPL-SCNC: 104 MMOL/L — SIGNIFICANT CHANGE UP (ref 98–110)
CO2 SERPL-SCNC: 20 MMOL/L — SIGNIFICANT CHANGE UP (ref 17–32)
CREAT SERPL-MCNC: 1 MG/DL — SIGNIFICANT CHANGE UP (ref 0.7–1.5)
EGFR: 55 ML/MIN/1.73M2 — LOW
GLUCOSE SERPL-MCNC: 142 MG/DL — HIGH (ref 70–99)
HCT VFR BLD CALC: 46.1 % — SIGNIFICANT CHANGE UP (ref 37–47)
HGB BLD-MCNC: 14.9 G/DL — SIGNIFICANT CHANGE UP (ref 12–16)
MCHC RBC-ENTMCNC: 30.8 PG — SIGNIFICANT CHANGE UP (ref 27–31)
MCHC RBC-ENTMCNC: 32.3 G/DL — SIGNIFICANT CHANGE UP (ref 32–37)
MCV RBC AUTO: 95.4 FL — SIGNIFICANT CHANGE UP (ref 81–99)
NRBC # BLD: 0 /100 WBCS — SIGNIFICANT CHANGE UP (ref 0–0)
NRBC BLD-RTO: 0 /100 WBCS — SIGNIFICANT CHANGE UP (ref 0–0)
PLATELET # BLD AUTO: 127 K/UL — LOW (ref 130–400)
PMV BLD: 10.4 FL — SIGNIFICANT CHANGE UP (ref 7.4–10.4)
POTASSIUM SERPL-MCNC: 4.2 MMOL/L — SIGNIFICANT CHANGE UP (ref 3.5–5)
POTASSIUM SERPL-SCNC: 4.2 MMOL/L — SIGNIFICANT CHANGE UP (ref 3.5–5)
RBC # BLD: 4.83 M/UL — SIGNIFICANT CHANGE UP (ref 4.2–5.4)
RBC # FLD: 12.7 % — SIGNIFICANT CHANGE UP (ref 11.5–14.5)
SODIUM SERPL-SCNC: 139 MMOL/L — SIGNIFICANT CHANGE UP (ref 135–146)
WBC # BLD: 2.82 K/UL — LOW (ref 4.8–10.8)
WBC # FLD AUTO: 2.82 K/UL — LOW (ref 4.8–10.8)

## 2025-01-20 PROCEDURE — 99232 SBSQ HOSP IP/OBS MODERATE 35: CPT

## 2025-01-20 RX ORDER — DEXTROMETHORPHAN HBR AND GUAIFENESIN ORAL SOLUTION 10; 100 MG/5ML; MG/5ML
5 LIQUID ORAL EVERY 6 HOURS
Refills: 0 | Status: DISCONTINUED | OUTPATIENT
Start: 2025-01-20 | End: 2025-01-22

## 2025-01-20 RX ORDER — OSELTAMIVIR PHOSPHATE 75 MG/1
30 CAPSULE ORAL EVERY 12 HOURS
Refills: 0 | Status: DISCONTINUED | OUTPATIENT
Start: 2025-01-21 | End: 2025-01-22

## 2025-01-20 RX ADMIN — Medication 50 MILLIGRAM(S): at 05:51

## 2025-01-20 RX ADMIN — IPRATROPIUM BROMIDE AND ALBUTEROL SULFATE 3 MILLILITER(S): .5; 2.5 SOLUTION RESPIRATORY (INHALATION) at 07:53

## 2025-01-20 RX ADMIN — Medication 100 MILLIGRAM(S): at 21:06

## 2025-01-20 RX ADMIN — APIXABAN 5 MILLIGRAM(S): 5 TABLET, FILM COATED ORAL at 17:14

## 2025-01-20 RX ADMIN — Medication 100 MILLIGRAM(S): at 11:19

## 2025-01-20 RX ADMIN — ATORVASTATIN CALCIUM 10 MILLIGRAM(S): 80 TABLET, FILM COATED ORAL at 21:07

## 2025-01-20 RX ADMIN — Medication 500 MILLIGRAM(S): at 05:51

## 2025-01-20 RX ADMIN — Medication 1 PACKET(S): at 11:20

## 2025-01-20 RX ADMIN — OMEGA-3 FATTY ACIDS CAP 1000 MG 4 GRAM(S): 1000 CAP at 11:19

## 2025-01-20 RX ADMIN — Medication 325 MILLIGRAM(S): at 11:19

## 2025-01-20 RX ADMIN — Medication 500 MILLIGRAM(S): at 17:14

## 2025-01-20 RX ADMIN — LATANOPROST 1 DROP(S): 50 SOLUTION OPHTHALMIC at 21:06

## 2025-01-20 RX ADMIN — OSELTAMIVIR PHOSPHATE 75 MILLIGRAM(S): 75 CAPSULE ORAL at 05:51

## 2025-01-20 RX ADMIN — Medication 40 MILLIGRAM(S): at 05:50

## 2025-01-20 RX ADMIN — Medication 40 MILLIGRAM(S): at 17:14

## 2025-01-20 RX ADMIN — Medication 1000 UNIT(S): at 11:19

## 2025-01-20 RX ADMIN — AZITHROMYCIN DIHYDRATE 255 MILLIGRAM(S): 500 TABLET, FILM COATED ORAL at 20:24

## 2025-01-20 RX ADMIN — PANTOPRAZOLE 40 MILLIGRAM(S): 20 TABLET, DELAYED RELEASE ORAL at 05:51

## 2025-01-20 RX ADMIN — IPRATROPIUM BROMIDE AND ALBUTEROL SULFATE 3 MILLILITER(S): .5; 2.5 SOLUTION RESPIRATORY (INHALATION) at 14:32

## 2025-01-20 RX ADMIN — Medication 100 MILLIGRAM(S): at 05:51

## 2025-01-20 RX ADMIN — APIXABAN 5 MILLIGRAM(S): 5 TABLET, FILM COATED ORAL at 05:51

## 2025-01-20 RX ADMIN — IPRATROPIUM BROMIDE AND ALBUTEROL SULFATE 3 MILLILITER(S): .5; 2.5 SOLUTION RESPIRATORY (INHALATION) at 19:48

## 2025-01-20 NOTE — CONSULT NOTE ADULT - SUBJECTIVE AND OBJECTIVE BOX
INFECTIOUS DISEASE CONSULT NOTE    Patient is a 84y old  Female who presents with a chief complaint of cough / flu A (19 Jan 2025 20:30)    HPI:  84-year-old female accompanied by her son and daughter at bed side.Pt  presenting for cough for 1 week, no relieving or aggravating factors, associated with subjective chills., past medical history of A-fib on Eliquis, hypertension, osteoporosis,   Of note her son was sick with similar symptoms couple weeks ago.  She had amoxicillin at home so has been taking that for the last week.  Earlier today she had a coughing fit and she had difficulty catching her breath but now feels improved.    med rec done at bed side.    denies any CP, SOB, palpitation, abdm pain, N/V/D, numbness, weakness  (19 Jan 2025 20:30)         Prior hospital charts reviewed [Yes]  Primary team notes reviewed [Yes]  Other consultant notes reviewed [Yes]    REVIEW OF SYSTEMS:      PAST MEDICAL & SURGICAL HISTORY:  HTN - Hypertension      Hypercholesteremia      GERD (Gastroesophageal Reflux Disease)      OA (Osteoarthritis)      History of Rotator Cuff Surgery  right shoulder 10 years ago      Cataract  2 years ago          SOCIAL HISTORY:  - Born in _____, migrated to  in 19XX  - Currently working as / Retired  - Lives with _____; no pets  - No recent travel  - Denies tobacco use  - Denies alcohol use  - Denies illicit drug use  - Currently sexually active, has one male/female sexual partner    FAMILY HISTORY:      Allergies:  No Known Allergies      ANTIMICROBIALS:  azithromycin  IVPB    azithromycin  IVPB 500 every 24 hours      ANTIMICROBIALS (past 90 days):  MEDICATIONS  (STANDING):    azithromycin  IVPB   500 milliGRAM(s) IV Intermittent (01-19-25 @ 21:07)    oseltamivir   75 milliGRAM(s) Oral (01-20-25 @ 05:51)        OTHER MEDS:   MEDICATIONS  (STANDING):  acetaminophen     Tablet .. 650 every 4 hours PRN  albuterol/ipratropium for Nebulization 3 every 6 hours  ALPRAZolam 0.25 two times a day PRN  aluminum hydroxide/magnesium hydroxide/simethicone Suspension 30 every 4 hours PRN  apixaban 5 every 12 hours  atorvastatin 10 at bedtime  benzonatate 100 three times a day  hydrochlorothiazide 25 daily  losartan 100 daily  meclizine 25 Once PRN  melatonin 3 at bedtime PRN  methocarbamol 500 two times a day  methylPREDNISolone sodium succinate Injectable 40 two times a day  metoprolol succinate ER 50 daily  ondansetron Injectable 4 every 4 hours PRN  pantoprazole    Tablet 40 before breakfast  psyllium Powder 1 daily  senna 2 at bedtime PRN      VITALS:  Vital Signs Last 24 Hrs  T(F): 97.4 (01-20-25 @ 04:36), Max: 97.9 (01-19-25 @ 17:53)    Vital Signs Last 24 Hrs  HR: 80 (01-20-25 @ 04:36) (80 - 107)  BP: 173/99 (01-20-25 @ 04:36) (137/91 - 173/99)  RR: 18 (01-20-25 @ 04:36)  SpO2: 94% (01-20-25 @ 04:36) (94% - 97%)  Wt(kg): --    EXAM:    Labs:                        14.9   2.82  )-----------( 127      ( 20 Jan 2025 06:27 )             46.1     01-20    139  |  104  |  33[H]  ----------------------------<  142[H]  4.2   |  20  |  1.0    Ca    9.0      20 Jan 2025 06:27    TPro  6.3  /  Alb  3.7  /  TBili  0.2  /  DBili  x   /  AST  33  /  ALT  17  /  AlkPhos  93  01-19      WBC Trend:  WBC Count: 2.82 (01-20-25 @ 06:27)  WBC Count: 4.17 (01-19-25 @ 18:22)      Auto Neutrophil #: 2.09 K/uL (01-19-25 @ 18:22)      Creatine Trend:  Creatinine: 1.0 (01-20)  Creatinine: 1.3 (01-19)      Liver Biochemical Testing Trend:  Alanine Aminotransferase (ALT/SGPT): 17 (01-19)  Aspartate Aminotransferase (AST/SGOT): 33 (01-19-25 @ 18:22)  Bilirubin Total: 0.2 (01-19)      Trend LDH      Auto Eosinophil %: 2.4 % (01-19-25 @ 18:22)      Urinalysis Basic - ( 20 Jan 2025 06:27 )    Color: x / Appearance: x / SG: x / pH: x  Gluc: 142 mg/dL / Ketone: x  / Bili: x / Urobili: x   Blood: x / Protein: x / Nitrite: x   Leuk Esterase: x / RBC: x / WBC x   Sq Epi: x / Non Sq Epi: x / Bacteria: x          MICROBIOLOGY:        RADIOLOGY:  imaging below personally reviewed      < from: Xray Chest 1 View- PORTABLE-Urgent (Xray Chest 1 View- PORTABLE-Urgent .) (01.19.25 @ 18:42) >  IMPRESSION:    Cardiomegaly. Hiatal hernia..    < end of copied text >   INFECTIOUS DISEASE CONSULT NOTE    Patient is a 84y old  Female who presents with a chief complaint of cough / flu A (19 Jan 2025 20:30)    HPI:  84-year-old female accompanied by her son and daughter at bed side.Pt  presenting for cough for 1 week, no relieving or aggravating factors, associated with subjective chills., past medical history of A-fib on Eliquis, hypertension, osteoporosis,   Of note her son was sick with similar symptoms couple weeks ago.  She had amoxicillin at home so has been taking that for the last week.  Earlier today she had a coughing fit and she had difficulty catching her breath but now feels improved.    med rec done at bed side.    denies any CP, SOB, palpitation, abdm pain, N/V/D, numbness, weakness  (19 Jan 2025 20:30)      Prior hospital charts reviewed [Yes]  Primary team notes reviewed [Yes]  Other consultant notes reviewed [Yes]    REVIEW OF SYSTEMS:  CONSTITUTIONAL: No fever or chills  HEAD: No lesion on scalp  EYES: No visual disturbance  ENT: No sore throat  RESPIRATORY: + cough, + shortness of breath; + congestion  CARDIOVASCULAR: No chest pain or palpitations  GASTROINTESTINAL: No abdominal or epigastric pain  GENITOURINARY: No dysuria  NEUROLOGICAL: No headache/dizziness  MUSCULOSKELETAL: No joint pain, erythema, or swelling; no back pain  SKIN: No itching, rashes  All other ROS negative except noted above      PAST MEDICAL & SURGICAL HISTORY:  HTN - Hypertension      Hypercholesteremia      GERD (Gastroesophageal Reflux Disease)      OA (Osteoarthritis)      History of Rotator Cuff Surgery  right shoulder 10 years ago      Cataract  2 years ago          SOCIAL HISTORY:  - No recent travel  - Denies tobacco use  - Denies alcohol use  - Denies illicit drug use    FAMILY HISTORY:  + family history HTN    Allergies:  No Known Allergies      ANTIMICROBIALS:  azithromycin  IVPB    azithromycin  IVPB 500 every 24 hours      ANTIMICROBIALS (past 90 days):  MEDICATIONS  (STANDING):    azithromycin  IVPB   500 milliGRAM(s) IV Intermittent (01-19-25 @ 21:07)    oseltamivir   75 milliGRAM(s) Oral (01-20-25 @ 05:51)        OTHER MEDS:   MEDICATIONS  (STANDING):  acetaminophen     Tablet .. 650 every 4 hours PRN  albuterol/ipratropium for Nebulization 3 every 6 hours  ALPRAZolam 0.25 two times a day PRN  aluminum hydroxide/magnesium hydroxide/simethicone Suspension 30 every 4 hours PRN  apixaban 5 every 12 hours  atorvastatin 10 at bedtime  benzonatate 100 three times a day  hydrochlorothiazide 25 daily  losartan 100 daily  meclizine 25 Once PRN  melatonin 3 at bedtime PRN  methocarbamol 500 two times a day  methylPREDNISolone sodium succinate Injectable 40 two times a day  metoprolol succinate ER 50 daily  ondansetron Injectable 4 every 4 hours PRN  pantoprazole    Tablet 40 before breakfast  psyllium Powder 1 daily  senna 2 at bedtime PRN      VITALS:  Vital Signs Last 24 Hrs  T(F): 97.4 (01-20-25 @ 04:36), Max: 97.9 (01-19-25 @ 17:53)    Vital Signs Last 24 Hrs  HR: 80 (01-20-25 @ 04:36) (80 - 107)  BP: 173/99 (01-20-25 @ 04:36) (137/91 - 173/99)  RR: 18 (01-20-25 @ 04:36)  SpO2: 94% (01-20-25 @ 04:36) (94% - 97%)  Wt(kg): --    EXAM:  GENERAL: NAD, lying in bed  HEAD: No head lesions  EYES: Conjunctiva pink and cornea white  EAR, NOSE, MOUTH, THROAT: Normal external ears and nose, no discharges; moist mucous membranes  NECK: Supple, nontender to palpation; no JVD  RESPIRATORY: Bilateral rhronchi  CARDIOVASCULAR: S1 S2  GASTROINTESTINAL: Soft, nontender, nondistended; normoactive bowel sounds  GENITOURINARY: No fry catheter, no CVA tenderness  EXTREMITIES: No clubbing, cyanosis, or petal edema  NERVOUS SYSTEM: Alert and oriented to person, time, place and situation, speech clear. No focal deficits   MUSCULOSKELETAL: No joint erythema, swelling or pain  SKIN: No rashes or lesions, no superficial thrombophlebitis  PSYCH: Normal affect    Labs:                        14.9   2.82  )-----------( 127      ( 20 Jan 2025 06:27 )             46.1     01-20    139  |  104  |  33[H]  ----------------------------<  142[H]  4.2   |  20  |  1.0    Ca    9.0      20 Jan 2025 06:27    TPro  6.3  /  Alb  3.7  /  TBili  0.2  /  DBili  x   /  AST  33  /  ALT  17  /  AlkPhos  93  01-19      WBC Trend:  WBC Count: 2.82 (01-20-25 @ 06:27)  WBC Count: 4.17 (01-19-25 @ 18:22)      Auto Neutrophil #: 2.09 K/uL (01-19-25 @ 18:22)      Creatine Trend:  Creatinine: 1.0 (01-20)  Creatinine: 1.3 (01-19)      Liver Biochemical Testing Trend:  Alanine Aminotransferase (ALT/SGPT): 17 (01-19)  Aspartate Aminotransferase (AST/SGOT): 33 (01-19-25 @ 18:22)  Bilirubin Total: 0.2 (01-19)      Trend LDH      Auto Eosinophil %: 2.4 % (01-19-25 @ 18:22)      Urinalysis Basic - ( 20 Jan 2025 06:27 )    Color: x / Appearance: x / SG: x / pH: x  Gluc: 142 mg/dL / Ketone: x  / Bili: x / Urobili: x   Blood: x / Protein: x / Nitrite: x   Leuk Esterase: x / RBC: x / WBC x   Sq Epi: x / Non Sq Epi: x / Bacteria: x          MICROBIOLOGY:        RADIOLOGY:  imaging below personally reviewed      < from: Xray Chest 1 View- PORTABLE-Urgent (Xray Chest 1 View- PORTABLE-Urgent .) (01.19.25 @ 18:42) >  IMPRESSION:    Cardiomegaly. Hiatal hernia..    < end of copied text >

## 2025-01-20 NOTE — PATIENT PROFILE ADULT - FALL HARM RISK - RISK INTERVENTIONS

## 2025-01-20 NOTE — PROGRESS NOTE ADULT - ASSESSMENT
#influenza - with wheeze - resolved, debility due to weakness from flu  not hypoxic  can change to po prednisone on dc  reactive airway dz from flu  stable to dc home  but pt did not want to leave - will prepare for dc     #chronic afib on eliquis  #HTN - bp stable

## 2025-01-20 NOTE — PHYSICAL THERAPY INITIAL EVALUATION ADULT - SPECIFY REASON(S)
Pt reports she feels too winded to get out of bed with PT at this time. Pt requests PT attempt tomorrow. PT will follow up tomorrow.

## 2025-01-20 NOTE — PATIENT PROFILE ADULT - FALL HARM RISK - FALLEN IN PAST
patient a&ox4, ambulatory at baseline. Patient states he went to PCP for annual check up and was advised to go to ER from physician because of to high BG, 485. reports SOB. Denies NVD, fever, chills, chest pain, h/a, blurry vision, dizziness. PMH - DM, "doctor told him he has a heart condition", patient unaware of what condition is. PSH - stent placed 12 years ago. Pt states that the appendix ruptured and was drained, not removed. Last meal was yesterday. pt reports he does not check his blood sugar
No

## 2025-01-20 NOTE — CONSULT NOTE ADULT - ASSESSMENT
84-year-old female accompanied by her son and daughter at bed side.Pt  presenting for cough for 1 week, no relieving or aggravating factors, associated with subjective chills.,    ID is consulted for influenza  Afebrile  WBC 2.82 < 4.17  On room air  Influenza PCR +  CXR no PNA    Antibiotics:  Azithromycin 1/19  Tamiflu 1/20 ->      IMPRESSION:  Influenza A  Viral bronchitis  Leukopenia  CKD  Immunosuppression / Immunosenescence which could result in poor clinical outcome    RECOMMENDATIONS:  - PO Tamiflu 30mg q12hrs for 4 more days  - D/C azithromycin  - Add PO Robitussin q6hrs  - Offloading and frequent position changes, aspiration precaution  - Trend WBC, fever curve, transaminases, creatinine daily      Myriam Damian D.O.  Attending Physician  Division of Infectious Diseases  [facility]  Please contact me via [employer] Teams

## 2025-01-21 PROCEDURE — 99231 SBSQ HOSP IP/OBS SF/LOW 25: CPT

## 2025-01-21 RX ORDER — PREDNISONE 5 MG/1
20 TABLET ORAL DAILY
Refills: 0 | Status: DISCONTINUED | OUTPATIENT
Start: 2025-01-22 | End: 2025-01-22

## 2025-01-21 RX ADMIN — IPRATROPIUM BROMIDE AND ALBUTEROL SULFATE 3 MILLILITER(S): .5; 2.5 SOLUTION RESPIRATORY (INHALATION) at 14:41

## 2025-01-21 RX ADMIN — OSELTAMIVIR PHOSPHATE 30 MILLIGRAM(S): 75 CAPSULE ORAL at 06:02

## 2025-01-21 RX ADMIN — APIXABAN 5 MILLIGRAM(S): 5 TABLET, FILM COATED ORAL at 17:03

## 2025-01-21 RX ADMIN — Medication 325 MILLIGRAM(S): at 11:32

## 2025-01-21 RX ADMIN — OSELTAMIVIR PHOSPHATE 30 MILLIGRAM(S): 75 CAPSULE ORAL at 17:03

## 2025-01-21 RX ADMIN — DEXTROMETHORPHAN HBR AND GUAIFENESIN ORAL SOLUTION 5 MILLILITER(S): 10; 100 LIQUID ORAL at 11:32

## 2025-01-21 RX ADMIN — Medication 100 MILLIGRAM(S): at 16:58

## 2025-01-21 RX ADMIN — Medication 100 MILLIGRAM(S): at 06:02

## 2025-01-21 RX ADMIN — DEXTROMETHORPHAN HBR AND GUAIFENESIN ORAL SOLUTION 5 MILLILITER(S): 10; 100 LIQUID ORAL at 06:00

## 2025-01-21 RX ADMIN — Medication 500 MILLIGRAM(S): at 06:01

## 2025-01-21 RX ADMIN — IPRATROPIUM BROMIDE AND ALBUTEROL SULFATE 3 MILLILITER(S): .5; 2.5 SOLUTION RESPIRATORY (INHALATION) at 19:30

## 2025-01-21 RX ADMIN — Medication 50 MILLIGRAM(S): at 06:01

## 2025-01-21 RX ADMIN — OMEGA-3 FATTY ACIDS CAP 1000 MG 4 GRAM(S): 1000 CAP at 11:32

## 2025-01-21 RX ADMIN — DEXTROMETHORPHAN HBR AND GUAIFENESIN ORAL SOLUTION 5 MILLILITER(S): 10; 100 LIQUID ORAL at 00:55

## 2025-01-21 RX ADMIN — DEXTROMETHORPHAN HBR AND GUAIFENESIN ORAL SOLUTION 5 MILLILITER(S): 10; 100 LIQUID ORAL at 17:03

## 2025-01-21 RX ADMIN — DEXTROMETHORPHAN HBR AND GUAIFENESIN ORAL SOLUTION 5 MILLILITER(S): 10; 100 LIQUID ORAL at 23:51

## 2025-01-21 RX ADMIN — Medication 500 MILLIGRAM(S): at 17:03

## 2025-01-21 RX ADMIN — ATORVASTATIN CALCIUM 10 MILLIGRAM(S): 80 TABLET, FILM COATED ORAL at 21:52

## 2025-01-21 RX ADMIN — Medication 40 MILLIGRAM(S): at 06:01

## 2025-01-21 RX ADMIN — Medication 1000 UNIT(S): at 11:32

## 2025-01-21 RX ADMIN — LATANOPROST 1 DROP(S): 50 SOLUTION OPHTHALMIC at 21:52

## 2025-01-21 RX ADMIN — APIXABAN 5 MILLIGRAM(S): 5 TABLET, FILM COATED ORAL at 06:01

## 2025-01-21 RX ADMIN — PANTOPRAZOLE 40 MILLIGRAM(S): 20 TABLET, DELAYED RELEASE ORAL at 06:01

## 2025-01-21 NOTE — PROGRESS NOTE ADULT - SUBJECTIVE AND OBJECTIVE BOX
GUANAKO SHAWNA  84y, Female  Allergy: No Known Allergies      CHIEF COMPLAINT: cough / flu A (20 Jan 2025 10:34)      HPI:  84-year-old female accompanied by her son and daughter at bed side.Pt  presenting for cough for 1 week, no relieving or aggravating factors, associated with subjective chills., past medical history of A-fib on Eliquis, hypertension, osteoporosis,   Of note her son was sick with similar symptoms couple weeks ago.  She had amoxicillin at home so has been taking that for the last week.  Earlier today she had a coughing fit and she had difficulty catching her breath but now feels improved.    med rec done at bed side.    denies any CP, SOB, palpitation, abdm pain, N/V/D, numbness, weakness  (19 Jan 2025 20:30)    HPI:    FAMILY HISTORY:    PAST MEDICAL & SURGICAL HISTORY:  HTN - Hypertension      Hypercholesteremia      GERD (Gastroesophageal Reflux Disease)      OA (Osteoarthritis)      History of Rotator Cuff Surgery  right shoulder 10 years ago      Cataract  2 years ago          SOCIAL HISTORY  Social History:      Home Medications:  ALPRAZolam 0.25 mg oral tablet: 1 tab(s) orally as needed for  anxiety (19 Jan 2025 20:36)  cholecalciferol 25 mcg (1000 intl units) oral tablet: 2 tab(s) orally once a day Vit D3 2000 IU daily (19 Jan 2025 20:36)  cyanocobalamin 1000 mcg oral tablet: 1 tab(s) orally 2 times a week (19 Jan 2025 20:36)  Dexlansoprazole: once a day (19 Jan 2025 20:36)  Eliquis 5 mg oral tablet: 1 tab(s) orally 2 times a day (19 Jan 2025 20:36)  ferrous gluconate: 324 mg tablet BID (19 Jan 2025 20:36)  hydroCHLOROthiazide 25 mg oral tablet: 1 tab(s) orally once a day (19 Jan 2025 20:36)  Latanoprost Ophthalmic: once a day (at bedtime) 1 drop each eye at bedtime (19 Jan 2025 20:36)  losartan 100 mg oral tablet: 1 tab(s) orally once a day (19 Jan 2025 23:57)  losartan 100 mg oral tablet: 1 tab(s) orally once a day (19 Jan 2025 20:36)  meclizine 25 mg oral tablet: 1 tab(s) orally 2 times a day (19 Jan 2025 20:36)  methocarbamol 500 mg oral tablet: 2 tab(s) orally 2 times a day (19 Jan 2025 20:36)  metoprolol: 50 mg daily (19 Jan 2025 20:36)  Omega-3 oral capsule: 2 tab(s) orally 2 times a day (19 Jan 2025 20:36)  pravastatin 40 mg oral tablet: 1 tab(s) orally once a day (19 Jan 2025 20:36)  psyllium: once a day (19 Jan 2025 20:36)  Tylenol 325 mg oral tablet: 2 tab(s) orally 2 times a day (19 Jan 2025 20:36)      ROS  General: Denies fevers, chills, nightsweats, weight loss  HEENT: Denies headache, rhinorrhea, sore throat, eye pain  CV: Denies CP, palpitations  PULM: Denies SOB, cough  GI: Denies abdominal pain, diarrhea  : Denies dysuria, hematuria  MSK: Denies arthralgias  SKIN: Denies rash   NEURO: Denies paresthesias, weakness  PSYCH: Denies depression    VITALS:  T(F): 97.4, Max: 97.9 (01-19-25 @ 17:53)  HR: 80  BP: 173/99  RR: 18Vital Signs Last 24 Hrs  T(C): 36.3 (20 Jan 2025 04:36), Max: 36.6 (19 Jan 2025 17:53)  T(F): 97.4 (20 Jan 2025 04:36), Max: 97.9 (19 Jan 2025 17:53)  HR: 80 (20 Jan 2025 04:36) (80 - 107)  BP: 173/99 (20 Jan 2025 04:36) (137/91 - 173/99)  BP(mean): 106 (19 Jan 2025 20:12) (106 - 106)  RR: 18 (20 Jan 2025 04:36) (18 - 20)  SpO2: 94% (20 Jan 2025 04:36) (94% - 97%)    Parameters below as of 19 Jan 2025 20:12  Patient On (Oxygen Delivery Method): room air        PHYSICAL EXAM:  Gen: NAD, resting in bed  HEENT: Normocephalic, atraumatic  Neck: supple, no lymphadenopathy  CV: Regular rate & regular rhythm  Lungs: CTABL no wheeze  Abdomen: Soft, NTND+ BS present  Ext: Warm, well perfused no CCE  Neuro: non focal, awake, CN II-XII intact   Skin: no rash, no erythema  Psych: no SI, HI, Hallucination     TESTS & MEASUREMENTS:                        14.9   2.82  )-----------( 127      ( 20 Jan 2025 06:27 )             46.1     01-20    139  |  104  |  33[H]  ----------------------------<  142[H]  4.2   |  20  |  1.0    Ca    9.0      20 Jan 2025 06:27    TPro  6.3  /  Alb  3.7  /  TBili  0.2  /  DBili  x   /  AST  33  /  ALT  17  /  AlkPhos  93  01-19      LIVER FUNCTIONS - ( 19 Jan 2025 18:22 )  Alb: 3.7 g/dL / Pro: 6.3 g/dL / ALK PHOS: 93 U/L / ALT: 17 U/L / AST: 33 U/L / GGT: x           Urinalysis Basic - ( 20 Jan 2025 06:27 )    Color: x / Appearance: x / SG: x / pH: x  Gluc: 142 mg/dL / Ketone: x  / Bili: x / Urobili: x   Blood: x / Protein: x / Nitrite: x   Leuk Esterase: x / RBC: x / WBC x   Sq Epi: x / Non Sq Epi: x / Bacteria: x            QRS axis to [] ° and NSR at a rate of [] BPM. There was no atrial enlargement. There was no ventricular hypertrophy. There were no ST-T changes and all intervals were normal.      INFECTIOUS DISEASES TESTING      RADIOLOGY & ADDITIONAL TESTS:  I have personally reviewed the last Chest xray  CXR  Xray Chest 1 View- PORTABLE-Urgent:   ACC: [id number] EXAM:  XR CHEST PORTABLE URGENT 1V   ORDERED BY: SALOMON ONEIL     PROCEDURE DATE:  01/19/2025          INTERPRETATION:  CLINICAL HISTORY: Cough    COMPARISON: October 5, 2015    TECHNIQUE: Portable frontal chest radiograph.    FINDINGS:    Support devices: None.    Cardiac/mediastinum/hilum: Hiatal hernia. Cardiomegaly, thoracic aortic   calcification.    Lung parenchyma/Pleura: No focal parenchymal opacities, pleural   effusions, or pneumothorax.    Skeleton/soft tissues: Thoracic spine degenerative changes. Right humeral   surgical anchors. Left shoulder chronic rotator cuff injury..      IMPRESSION:    Cardiomegaly. Hiatal hernia..    --- End of Report ---            CARLOS CRESPO MD; Attending Radiologist  This document has been electronically signed. Jan 20 2025  6:17AM (01-19-25 @ 18:42)      CT      CARDIOLOGY TESTING  12 Lead ECG:   Ventricular Rate 92 BPM    QRS Duration 94 ms    Q-T Interval 366 ms    QTC Calculation(Bazett) 452 ms    R Axis -2 degrees    T Axis -34 degrees    Diagnosis Line Atrial fibrillation  Minimal voltage criteria for LVH, may be normal variant ( Balta product )  Nonspecific ST-T changes  Confirmed by MEAGAN SPARROW MD ([id number]) on 1/20/2025 7:22:38 AM (01-19-25 @ 20:54)      MEDICATIONS  (STANDING):  albuterol/ipratropium for Nebulization 3 milliLiter(s) Nebulizer every 6 hours  apixaban 5 milliGRAM(s) Oral every 12 hours  atorvastatin 10 milliGRAM(s) Oral at bedtime  azithromycin  IVPB      azithromycin  IVPB 500 milliGRAM(s) IV Intermittent every 24 hours  benzonatate 100 milliGRAM(s) Oral three times a day  cholecalciferol 1000 Unit(s) Oral daily  ferrous    sulfate 325 milliGRAM(s) Oral daily  hydrochlorothiazide 25 milliGRAM(s) Oral daily  latanoprost 0.005% Ophthalmic Solution 1 Drop(s) Both EYES at bedtime  losartan 100 milliGRAM(s) Oral daily  methocarbamol 500 milliGRAM(s) Oral two times a day  methylPREDNISolone sodium succinate Injectable 40 milliGRAM(s) IV Push two times a day  metoprolol succinate ER 50 milliGRAM(s) Oral daily  omega-3-Acid Ethyl Esters 4 Gram(s) Oral daily  pantoprazole    Tablet 40 milliGRAM(s) Oral before breakfast  psyllium Powder 1 Packet(s) Oral daily    MEDICATIONS  (PRN):  acetaminophen     Tablet .. 650 milliGRAM(s) Oral every 4 hours PRN Temp greater or equal to 38C (100.4F), Mild Pain (1 - 3)  ALPRAZolam 0.25 milliGRAM(s) Oral two times a day PRN for anxiety  aluminum hydroxide/magnesium hydroxide/simethicone Suspension 30 milliLiter(s) Oral every 4 hours PRN Dyspepsia  meclizine 25 milliGRAM(s) Oral Once PRN Dizziness  melatonin 3 milliGRAM(s) Oral at bedtime PRN Insomnia  ondansetron Injectable 4 milliGRAM(s) IV Push every 4 hours PRN Nausea and/or Vomiting  senna 2 Tablet(s) Oral at bedtime PRN Constipation      ANTIBIOTICS:  azithromycin  IVPB      azithromycin  IVPB 500 milliGRAM(s) IV Intermittent every 24 hours      All available historical data has been reviewed    ASSESSMENT  84y F admitted with Influenza with other respiratory manifestations, other influenza virus identified        PROBLEMS  
    GUANAKO SHAWNA  84y, Female  Allergy: No Known Allergies      CHIEF COMPLAINT: cough / flu A (20 Jan 2025 10:34)      HPI:  84-year-old female accompanied by her son and daughter at bed side.Pt  presenting for cough for 1 week, no relieving or aggravating factors, associated with subjective chills., past medical history of A-fib on Eliquis, hypertension, osteoporosis,   Of note her son was sick with similar symptoms couple weeks ago.  She had amoxicillin at home so has been taking that for the last week.  Earlier today she had a coughing fit and she had difficulty catching her breath but now feels improved.    med rec done at bed side.    denies any CP, SOB, palpitation, abdm pain, N/V/D, numbness, weakness  (19 Jan 2025 20:30)    HPI:    FAMILY HISTORY:    PAST MEDICAL & SURGICAL HISTORY:  HTN - Hypertension      Hypercholesteremia      GERD (Gastroesophageal Reflux Disease)      OA (Osteoarthritis)      History of Rotator Cuff Surgery  right shoulder 10 years ago      Cataract  2 years ago          SOCIAL HISTORY  Social History:      Home Medications:  ALPRAZolam 0.25 mg oral tablet: 1 tab(s) orally as needed for  anxiety (19 Jan 2025 20:36)  cholecalciferol 25 mcg (1000 intl units) oral tablet: 2 tab(s) orally once a day Vit D3 2000 IU daily (19 Jan 2025 20:36)  cyanocobalamin 1000 mcg oral tablet: 1 tab(s) orally 2 times a week (19 Jan 2025 20:36)  Dexlansoprazole: once a day (19 Jan 2025 20:36)  Eliquis 5 mg oral tablet: 1 tab(s) orally 2 times a day (19 Jan 2025 20:36)  ferrous gluconate: 324 mg tablet BID (19 Jan 2025 20:36)  hydroCHLOROthiazide 25 mg oral tablet: 1 tab(s) orally once a day (19 Jan 2025 20:36)  Latanoprost Ophthalmic: once a day (at bedtime) 1 drop each eye at bedtime (19 Jan 2025 20:36)  losartan 100 mg oral tablet: 1 tab(s) orally once a day (19 Jan 2025 23:57)  losartan 100 mg oral tablet: 1 tab(s) orally once a day (19 Jan 2025 20:36)  meclizine 25 mg oral tablet: 1 tab(s) orally 2 times a day (19 Jan 2025 20:36)  methocarbamol 500 mg oral tablet: 2 tab(s) orally 2 times a day (19 Jan 2025 20:36)  metoprolol: 50 mg daily (19 Jan 2025 20:36)  Omega-3 oral capsule: 2 tab(s) orally 2 times a day (19 Jan 2025 20:36)  pravastatin 40 mg oral tablet: 1 tab(s) orally once a day (19 Jan 2025 20:36)  psyllium: once a day (19 Jan 2025 20:36)  Tylenol 325 mg oral tablet: 2 tab(s) orally 2 times a day (19 Jan 2025 20:36)      ROS  General: Denies fevers, chills, nightsweats, weight loss  HEENT: Denies headache, rhinorrhea, sore throat, eye pain  CV: Denies CP, palpitations  PULM: Denies SOB, cough  GI: Denies abdominal pain, diarrhea  : Denies dysuria, hematuria  MSK: Denies arthralgias  SKIN: Denies rash   NEURO: Denies paresthesias, weakness  PSYCH: Denies depression    VITALS:  T(F): 97.4, Max: 97.9 (01-19-25 @ 17:53)  HR: 80  BP: 173/99  RR: 18Vital Signs Last 24 Hrs  T(C): 36.3 (20 Jan 2025 04:36), Max: 36.6 (19 Jan 2025 17:53)  T(F): 97.4 (20 Jan 2025 04:36), Max: 97.9 (19 Jan 2025 17:53)  HR: 80 (20 Jan 2025 04:36) (80 - 107)  BP: 173/99 (20 Jan 2025 04:36) (137/91 - 173/99)  BP(mean): 106 (19 Jan 2025 20:12) (106 - 106)  RR: 18 (20 Jan 2025 04:36) (18 - 20)  SpO2: 94% (20 Jan 2025 04:36) (94% - 97%)    Parameters below as of 19 Jan 2025 20:12  Patient On (Oxygen Delivery Method): room air        PHYSICAL EXAM:  Gen: NAD, resting in bed  HEENT: Normocephalic, atraumatic  Neck: supple, no lymphadenopathy  CV: Regular rate & regular rhythm  Lungs: CTABL no wheeze  Abdomen: Soft, NTND+ BS present  Ext: Warm, well perfused no CCE  Neuro: non focal, awake, CN II-XII intact   Skin: no rash, no erythema  Psych: no SI, HI, Hallucination     TESTS & MEASUREMENTS:                        14.9   2.82  )-----------( 127      ( 20 Jan 2025 06:27 )             46.1     01-20    139  |  104  |  33[H]  ----------------------------<  142[H]  4.2   |  20  |  1.0    Ca    9.0      20 Jan 2025 06:27    TPro  6.3  /  Alb  3.7  /  TBili  0.2  /  DBili  x   /  AST  33  /  ALT  17  /  AlkPhos  93  01-19      LIVER FUNCTIONS - ( 19 Jan 2025 18:22 )  Alb: 3.7 g/dL / Pro: 6.3 g/dL / ALK PHOS: 93 U/L / ALT: 17 U/L / AST: 33 U/L / GGT: x           Urinalysis Basic - ( 20 Jan 2025 06:27 )    Color: x / Appearance: x / SG: x / pH: x  Gluc: 142 mg/dL / Ketone: x  / Bili: x / Urobili: x   Blood: x / Protein: x / Nitrite: x   Leuk Esterase: x / RBC: x / WBC x   Sq Epi: x / Non Sq Epi: x / Bacteria: x            QRS axis to [] ° and NSR at a rate of [] BPM. There was no atrial enlargement. There was no ventricular hypertrophy. There were no ST-T changes and all intervals were normal.      INFECTIOUS DISEASES TESTING      RADIOLOGY & ADDITIONAL TESTS:  I have personally reviewed the last Chest xray  CXR  Xray Chest 1 View- PORTABLE-Urgent:   ACC: [id number] EXAM:  XR CHEST PORTABLE URGENT 1V   ORDERED BY: SALOMON ONEIL     PROCEDURE DATE:  01/19/2025          INTERPRETATION:  CLINICAL HISTORY: Cough    COMPARISON: October 5, 2015    TECHNIQUE: Portable frontal chest radiograph.    FINDINGS:    Support devices: None.    Cardiac/mediastinum/hilum: Hiatal hernia. Cardiomegaly, thoracic aortic   calcification.    Lung parenchyma/Pleura: No focal parenchymal opacities, pleural   effusions, or pneumothorax.    Skeleton/soft tissues: Thoracic spine degenerative changes. Right humeral   surgical anchors. Left shoulder chronic rotator cuff injury..      IMPRESSION:    Cardiomegaly. Hiatal hernia..    --- End of Report ---            CARLOS CRESPO MD; Attending Radiologist  This document has been electronically signed. Jan 20 2025  6:17AM (01-19-25 @ 18:42)      CT      CARDIOLOGY TESTING  12 Lead ECG:   Ventricular Rate 92 BPM    QRS Duration 94 ms    Q-T Interval 366 ms    QTC Calculation(Bazett) 452 ms    R Axis -2 degrees    T Axis -34 degrees    Diagnosis Line Atrial fibrillation  Minimal voltage criteria for LVH, may be normal variant ( Balta product )  Nonspecific ST-T changes  Confirmed by MEAGAN SPARROW MD ([id number]) on 1/20/2025 7:22:38 AM (01-19-25 @ 20:54)      MEDICATIONS  (STANDING):  albuterol/ipratropium for Nebulization 3 milliLiter(s) Nebulizer every 6 hours  apixaban 5 milliGRAM(s) Oral every 12 hours  atorvastatin 10 milliGRAM(s) Oral at bedtime  azithromycin  IVPB      azithromycin  IVPB 500 milliGRAM(s) IV Intermittent every 24 hours  benzonatate 100 milliGRAM(s) Oral three times a day  cholecalciferol 1000 Unit(s) Oral daily  ferrous    sulfate 325 milliGRAM(s) Oral daily  hydrochlorothiazide 25 milliGRAM(s) Oral daily  latanoprost 0.005% Ophthalmic Solution 1 Drop(s) Both EYES at bedtime  losartan 100 milliGRAM(s) Oral daily  methocarbamol 500 milliGRAM(s) Oral two times a day  methylPREDNISolone sodium succinate Injectable 40 milliGRAM(s) IV Push two times a day  metoprolol succinate ER 50 milliGRAM(s) Oral daily  omega-3-Acid Ethyl Esters 4 Gram(s) Oral daily  pantoprazole    Tablet 40 milliGRAM(s) Oral before breakfast  psyllium Powder 1 Packet(s) Oral daily    MEDICATIONS  (PRN):  acetaminophen     Tablet .. 650 milliGRAM(s) Oral every 4 hours PRN Temp greater or equal to 38C (100.4F), Mild Pain (1 - 3)  ALPRAZolam 0.25 milliGRAM(s) Oral two times a day PRN for anxiety  aluminum hydroxide/magnesium hydroxide/simethicone Suspension 30 milliLiter(s) Oral every 4 hours PRN Dyspepsia  meclizine 25 milliGRAM(s) Oral Once PRN Dizziness  melatonin 3 milliGRAM(s) Oral at bedtime PRN Insomnia  ondansetron Injectable 4 milliGRAM(s) IV Push every 4 hours PRN Nausea and/or Vomiting  senna 2 Tablet(s) Oral at bedtime PRN Constipation      ANTIBIOTICS:  azithromycin  IVPB      azithromycin  IVPB 500 milliGRAM(s) IV Intermittent every 24 hours      All available historical data has been reviewed    ASSESSMENT  84y F admitted with Influenza with other respiratory manifestations, other influenza virus identified        PROBLEMS

## 2025-01-21 NOTE — PROGRESS NOTE ADULT - ASSESSMENT
#influenza - with wheeze - resolved, debility due to weakness from flu  not hypoxic  can change to po prednisone on dc  reactive airway dz from flu  stable to dc home  but pt did not want to leave - will prepare for dc on 1/22    #chronic afib on eliquis  #HTN - bp stable  spoke to son on phone

## 2025-01-22 ENCOUNTER — TRANSCRIPTION ENCOUNTER (OUTPATIENT)
Age: 85
End: 2025-01-22

## 2025-01-22 VITALS
SYSTOLIC BLOOD PRESSURE: 112 MMHG | HEART RATE: 55 BPM | RESPIRATION RATE: 16 BRPM | OXYGEN SATURATION: 96 % | TEMPERATURE: 97 F | DIASTOLIC BLOOD PRESSURE: 80 MMHG

## 2025-01-22 PROCEDURE — 99239 HOSP IP/OBS DSCHRG MGMT >30: CPT

## 2025-01-22 RX ORDER — PREDNISONE 5 MG/1
1 TABLET ORAL
Qty: 3 | Refills: 0
Start: 2025-01-22

## 2025-01-22 RX ORDER — DEXLANSOPRAZOLE 60 MG/1
20 CAPSULE, DELAYED RELEASE ORAL
Qty: 0 | Refills: 0 | DISCHARGE

## 2025-01-22 RX ORDER — LOSARTAN POTASSIUM 100 MG
1 TABLET ORAL
Refills: 0 | DISCHARGE

## 2025-01-22 RX ORDER — METOPROLOL SUCCINATE 25 MG
1 TABLET, EXTENDED RELEASE 24 HR ORAL
Qty: 0 | Refills: 0 | DISCHARGE
Start: 2025-01-22

## 2025-01-22 RX ORDER — HYDROCHLOROTHIAZIDE 50 MG
1 TABLET ORAL
Qty: 0 | Refills: 0 | DISCHARGE
Start: 2025-01-22

## 2025-01-22 RX ORDER — METOPROLOL SUCCINATE 25 MG
0 TABLET, EXTENDED RELEASE 24 HR ORAL
Refills: 0 | DISCHARGE

## 2025-01-22 RX ORDER — HYDROCHLOROTHIAZIDE 50 MG
1 TABLET ORAL
Refills: 0 | DISCHARGE

## 2025-01-22 RX ORDER — OSELTAMIVIR PHOSPHATE 75 MG/1
1 CAPSULE ORAL
Qty: 3 | Refills: 0
Start: 2025-01-22

## 2025-01-22 RX ORDER — ALPRAZOLAM 2 MG
1 TABLET ORAL
Refills: 0 | DISCHARGE

## 2025-01-22 RX ORDER — CHLORHEXIDINE GLUCONATE 4 %
0 LIQUID (ML) TOPICAL
Refills: 0 | DISCHARGE

## 2025-01-22 RX ORDER — ALPRAZOLAM 2 MG
1 TABLET ORAL
Qty: 0 | Refills: 0 | DISCHARGE
Start: 2025-01-22

## 2025-01-22 RX ORDER — CHLORHEXIDINE GLUCONATE 4 %
7 LIQUID (ML) TOPICAL
Qty: 0 | Refills: 0 | DISCHARGE
Start: 2025-01-22

## 2025-01-22 RX ADMIN — APIXABAN 5 MILLIGRAM(S): 5 TABLET, FILM COATED ORAL at 05:07

## 2025-01-22 RX ADMIN — Medication 100 MILLIGRAM(S): at 05:07

## 2025-01-22 RX ADMIN — Medication 50 MILLIGRAM(S): at 05:08

## 2025-01-22 RX ADMIN — Medication 325 MILLIGRAM(S): at 11:55

## 2025-01-22 RX ADMIN — DEXTROMETHORPHAN HBR AND GUAIFENESIN ORAL SOLUTION 5 MILLILITER(S): 10; 100 LIQUID ORAL at 11:55

## 2025-01-22 RX ADMIN — IPRATROPIUM BROMIDE AND ALBUTEROL SULFATE 3 MILLILITER(S): .5; 2.5 SOLUTION RESPIRATORY (INHALATION) at 14:05

## 2025-01-22 RX ADMIN — OSELTAMIVIR PHOSPHATE 30 MILLIGRAM(S): 75 CAPSULE ORAL at 05:07

## 2025-01-22 RX ADMIN — OMEGA-3 FATTY ACIDS CAP 1000 MG 4 GRAM(S): 1000 CAP at 11:55

## 2025-01-22 RX ADMIN — Medication 500 MILLIGRAM(S): at 05:08

## 2025-01-22 RX ADMIN — DEXTROMETHORPHAN HBR AND GUAIFENESIN ORAL SOLUTION 5 MILLILITER(S): 10; 100 LIQUID ORAL at 05:07

## 2025-01-22 RX ADMIN — IPRATROPIUM BROMIDE AND ALBUTEROL SULFATE 3 MILLILITER(S): .5; 2.5 SOLUTION RESPIRATORY (INHALATION) at 07:54

## 2025-01-22 RX ADMIN — Medication 1000 UNIT(S): at 11:54

## 2025-01-22 RX ADMIN — PANTOPRAZOLE 40 MILLIGRAM(S): 20 TABLET, DELAYED RELEASE ORAL at 05:08

## 2025-01-22 NOTE — DISCHARGE NOTE PROVIDER - NSDCCPCAREPLAN_GEN_ALL_CORE_FT
PRINCIPAL DISCHARGE DIAGNOSIS  Diagnosis: Influenza A  Assessment and Plan of Treatment: please finish course of tamiflu and prednisone sent to pharmacy      SECONDARY DISCHARGE DIAGNOSES  Diagnosis: Weakness  Assessment and Plan of Treatment:

## 2025-01-22 NOTE — DISCHARGE NOTE PROVIDER - NSDCMRMEDTOKEN_GEN_ALL_CORE_FT
ALPRAZolam 0.25 mg oral tablet: 1 tab(s) orally 2 times a day As needed for anxiety  cholecalciferol 25 mcg (1000 intl units) oral tablet: 2 tab(s) orally once a day Vit D3 2000 IU daily  cyanocobalamin 1000 mcg oral tablet: 1 tab(s) orally 2 times a week  Dexlansoprazole: 20 milligram(s) once a day  Eliquis 5 mg oral tablet: 1 tab(s) orally 2 times a day  ferrous gluconate: 324 mg tablet BID  hydroCHLOROthiazide 25 mg oral tablet: 1 tab(s) orally once a day  Latanoprost Ophthalmic: once a day (at bedtime) 1 drop each eye at bedtime  losartan 100 mg oral tablet: 1 tab(s) orally once a day  meclizine 25 mg oral tablet: 1 tab(s) orally 2 times a day  methocarbamol 500 mg oral tablet: 2 tab(s) orally 2 times a day  metoprolol succinate 50 mg oral tablet, extended release: 1 tab(s) orally once a day  Omega-3 oral capsule: 2 tab(s) orally 2 times a day  pravastatin 40 mg oral tablet: 1 tab(s) orally once a day  predniSONE 20 mg oral tablet: 1 tab(s) orally once a day  psyllium 3.4 g/7 g oral powder for reconstitution: 7 gram(s) orally 2 times a day  Tamiflu 30 mg oral capsule: 1 cap(s) orally every 12 hours  Tylenol 325 mg oral tablet: 2 tab(s) orally 2 times a day

## 2025-01-22 NOTE — DIETITIAN INITIAL EVALUATION ADULT - OTHER CALCULATIONS
Energy: 1420kcal/day (using MSJ 1.0AF due to obese BMI)  Protein: 71-77g/day (using IBW 59kg w/ 1.2-1.5g/kg due to obese BMI vs. large diff btwn IBW/ABW)  Fluid: 1mL/kcal/day

## 2025-01-22 NOTE — DISCHARGE NOTE PROVIDER - CARE PROVIDER_API CALL
Aline Velasquez  Internal Medicine  [address] [place] [id number]  Phone: [phone]  Fax: [phone]  Follow Up Time: 1 week

## 2025-01-22 NOTE — DIETITIAN INITIAL EVALUATION ADULT - PERTINENT MEDS FT
MEDICATIONS  (STANDING):  albuterol/ipratropium for Nebulization 3 milliLiter(s) Nebulizer every 6 hours  apixaban 5 milliGRAM(s) Oral every 12 hours  atorvastatin 10 milliGRAM(s) Oral at bedtime  cholecalciferol 1000 Unit(s) Oral daily  ferrous    sulfate 325 milliGRAM(s) Oral daily  guaifenesin/dextromethorphan Oral Liquid 5 milliLiter(s) Oral every 6 hours  hydrochlorothiazide 25 milliGRAM(s) Oral daily  latanoprost 0.005% Ophthalmic Solution 1 Drop(s) Both EYES at bedtime  losartan 100 milliGRAM(s) Oral daily  methocarbamol 500 milliGRAM(s) Oral two times a day  metoprolol succinate ER 50 milliGRAM(s) Oral daily  omega-3-Acid Ethyl Esters 4 Gram(s) Oral daily  oseltamivir 30 milliGRAM(s) Oral every 12 hours  pantoprazole    Tablet 40 milliGRAM(s) Oral before breakfast  predniSONE   Tablet 20 milliGRAM(s) Oral daily  psyllium Powder 1 Packet(s) Oral daily    MEDICATIONS  (PRN):  ALPRAZolam 0.25 milliGRAM(s) Oral two times a day PRN for anxiety  aluminum hydroxide/magnesium hydroxide/simethicone Suspension 30 milliLiter(s) Oral every 4 hours PRN Dyspepsia  meclizine 25 milliGRAM(s) Oral Once PRN Dizziness  ondansetron Injectable 4 milliGRAM(s) IV Push every 4 hours PRN Nausea and/or Vomiting  senna 2 Tablet(s) Oral at bedtime PRN Constipation

## 2025-01-22 NOTE — DIETITIAN INITIAL EVALUATION ADULT - ENERGY INTAKE
At admit, pt reports suboptimal appetite, <50% of meals are being consumed at this time. EMR PO doc: 1/21: 26-75%. This morning pt had eggs and toast for breakfast.

## 2025-01-22 NOTE — DIETITIAN INITIAL EVALUATION ADULT - ORAL INTAKE PTA/DIET HISTORY
Pt reports having a good appetite normally, but these past 2-3 weeks she has been sick with the flu and has not been eating well. Pt says she was maybe eating 2 meals/day in the past 2 weeks. Says her UBW is 95.4kg vs. wt at admit: 95.3kg -- stable, no wt loss noted. NKFA. Takes vitamin B12 and D3.

## 2025-01-22 NOTE — DISCHARGE NOTE PROVIDER - HOSPITAL COURSE
#influenza - with wheeze - resolved, debility due to weakness from flu  not hypoxic  can change to po prednisone on dc  reactive airway dz from flu  stable to dc home  but pt did not want to leave - will prepare for dc on 1/22    #chronic afib on eliquis  #HTN - bp stable    Medically stable for discharge. Will finish course of Tamiflu post dc.

## 2025-01-22 NOTE — DISCHARGE NOTE NURSING/CASE MANAGEMENT/SOCIAL WORK - PATIENT PORTAL LINK FT
You can access the FollowMyHealth Patient Portal offered by [facility] by registering at the following website: [email or web address]. By joining [facility]’s FollowMyHealth portal, you will also be able to view your health information using other applications (apps) compatible with our system.

## 2025-01-22 NOTE — DIETITIAN INITIAL EVALUATION ADULT - OTHER INFO
--84-year-old female presenting for cough for 1 week, no relieving or aggravating factors, associated with subjective chills  #influenza - with wheeze - resolved, debility due to weakness from flu  -not hypoxic

## 2025-01-31 DIAGNOSIS — M81.0 AGE-RELATED OSTEOPOROSIS WITHOUT CURRENT PATHOLOGICAL FRACTURE: ICD-10-CM

## 2025-01-31 DIAGNOSIS — I12.9 HYPERTENSIVE CHRONIC KIDNEY DISEASE WITH STAGE 1 THROUGH STAGE 4 CHRONIC KIDNEY DISEASE, OR UNSPECIFIED CHRONIC KIDNEY DISEASE: ICD-10-CM

## 2025-01-31 DIAGNOSIS — J20.8 ACUTE BRONCHITIS DUE TO OTHER SPECIFIED ORGANISMS: ICD-10-CM

## 2025-01-31 DIAGNOSIS — Z79.01 LONG TERM (CURRENT) USE OF ANTICOAGULANTS: ICD-10-CM

## 2025-01-31 DIAGNOSIS — D63.1 ANEMIA IN CHRONIC KIDNEY DISEASE: ICD-10-CM

## 2025-01-31 DIAGNOSIS — D84.9 IMMUNODEFICIENCY, UNSPECIFIED: ICD-10-CM

## 2025-01-31 DIAGNOSIS — F41.9 ANXIETY DISORDER, UNSPECIFIED: ICD-10-CM

## 2025-01-31 DIAGNOSIS — E78.00 PURE HYPERCHOLESTEROLEMIA, UNSPECIFIED: ICD-10-CM

## 2025-01-31 DIAGNOSIS — I48.20 CHRONIC ATRIAL FIBRILLATION, UNSPECIFIED: ICD-10-CM

## 2025-01-31 DIAGNOSIS — J10.1 INFLUENZA DUE TO OTHER IDENTIFIED INFLUENZA VIRUS WITH OTHER RESPIRATORY MANIFESTATIONS: ICD-10-CM
